# Patient Record
Sex: MALE | Race: WHITE | Employment: OTHER | URBAN - METROPOLITAN AREA
[De-identification: names, ages, dates, MRNs, and addresses within clinical notes are randomized per-mention and may not be internally consistent; named-entity substitution may affect disease eponyms.]

---

## 2018-05-10 ENCOUNTER — HOSPITAL ENCOUNTER (OUTPATIENT)
Age: 68
Setting detail: SPECIMEN
Discharge: HOME | End: 2018-05-10
Payer: MEDICARE

## 2018-05-10 DIAGNOSIS — K57.32 DIVERTICULITIS OF LARGE INTESTINE WITHOUT PERFORATION OR ABSCESS WITHOUT BLEEDING: ICD-10-CM

## 2018-05-10 LAB
ALBUMIN SERPL-MCNC: 4.4 G/DL (ref 3.5–5.2)
ALBUMIN/GLOBULIN RATIO: 1.4 (ref 1–2.5)
ALP BLD-CCNC: 63 U/L (ref 40–129)
ALT SERPL-CCNC: 33 U/L (ref 5–41)
ANION GAP SERPL CALCULATED.3IONS-SCNC: 11 MMOL/L (ref 9–17)
AST SERPL-CCNC: 32 U/L
BILIRUB SERPL-MCNC: 0.36 MG/DL (ref 0.3–1.2)
BUN BLDV-MCNC: 5 MG/DL (ref 8–23)
BUN/CREAT BLD: ABNORMAL (ref 9–20)
CALCIUM SERPL-MCNC: 9.1 MG/DL (ref 8.6–10.4)
CHLORIDE BLD-SCNC: 102 MMOL/L (ref 98–107)
CO2: 27 MMOL/L (ref 20–31)
CREAT SERPL-MCNC: 0.56 MG/DL (ref 0.7–1.2)
GFR AFRICAN AMERICAN: >60 ML/MIN
GFR NON-AFRICAN AMERICAN: >60 ML/MIN
GFR SERPL CREATININE-BSD FRML MDRD: ABNORMAL ML/MIN/{1.73_M2}
GFR SERPL CREATININE-BSD FRML MDRD: ABNORMAL ML/MIN/{1.73_M2}
GLUCOSE BLD-MCNC: 99 MG/DL (ref 70–99)
POTASSIUM SERPL-SCNC: 4.7 MMOL/L (ref 3.7–5.3)
SODIUM BLD-SCNC: 140 MMOL/L (ref 135–144)
TOTAL PROTEIN: 7.5 G/DL (ref 6.4–8.3)

## 2018-05-14 ENCOUNTER — HOSPITAL ENCOUNTER (OUTPATIENT)
Dept: CT IMAGING | Facility: CLINIC | Age: 68
Discharge: HOME | End: 2018-05-16
Payer: MEDICARE

## 2018-05-14 DIAGNOSIS — K57.32 DIVERTICULITIS OF LARGE INTESTINE WITHOUT PERFORATION OR ABSCESS WITHOUT BLEEDING: ICD-10-CM

## 2018-05-14 PROCEDURE — 2580000003 HC RX 258: Performed by: FAMILY MEDICINE

## 2018-05-14 PROCEDURE — 6360000004 HC RX CONTRAST MEDICATION: Performed by: FAMILY MEDICINE

## 2018-05-14 PROCEDURE — 74177 CT ABD & PELVIS W/CONTRAST: CPT

## 2018-05-14 RX ORDER — 0.9 % SODIUM CHLORIDE 0.9 %
80 INTRAVENOUS SOLUTION INTRAVENOUS ONCE
Status: COMPLETED | OUTPATIENT
Start: 2018-05-14 | End: 2018-05-14

## 2018-05-14 RX ORDER — SODIUM CHLORIDE 0.9 % (FLUSH) 0.9 %
10 SYRINGE (ML) INJECTION PRN
Status: DISCONTINUED | OUTPATIENT
Start: 2018-05-14 | End: 2018-05-17 | Stop reason: HOSPADM

## 2018-05-14 RX ADMIN — IOPAMIDOL 75 ML: 755 INJECTION, SOLUTION INTRAVENOUS at 12:04

## 2018-05-14 RX ADMIN — SODIUM CHLORIDE 80 ML: 9 INJECTION, SOLUTION INTRAVENOUS at 12:05

## 2018-05-14 RX ADMIN — IOHEXOL 50 ML: 240 INJECTION, SOLUTION INTRATHECAL; INTRAVASCULAR; INTRAVENOUS; ORAL at 12:04

## 2018-05-14 RX ADMIN — Medication 10 ML: at 12:05

## 2018-09-12 ENCOUNTER — HOSPITAL ENCOUNTER (OUTPATIENT)
Age: 68
Setting detail: SPECIMEN
Discharge: HOME | End: 2018-09-12
Payer: MEDICARE

## 2018-09-12 DIAGNOSIS — E78.00 PURE HYPERCHOLESTEROLEMIA: ICD-10-CM

## 2018-09-12 DIAGNOSIS — R53.83 FATIGUE, UNSPECIFIED TYPE: ICD-10-CM

## 2018-09-12 DIAGNOSIS — E55.9 VITAMIN D DEFICIENCY: ICD-10-CM

## 2018-09-12 DIAGNOSIS — N42.9 ABNORMALITY DETECTED ON RECTAL EXAMINATION OF PROSTATE: ICD-10-CM

## 2018-09-12 DIAGNOSIS — Z00.00 WELL ADULT EXAM: ICD-10-CM

## 2018-09-12 LAB
ABSOLUTE EOS #: 0.2 K/UL (ref 0–0.44)
ABSOLUTE IMMATURE GRANULOCYTE: <0.03 K/UL (ref 0–0.3)
ABSOLUTE LYMPH #: 2.49 K/UL (ref 1.1–3.7)
ABSOLUTE MONO #: 0.63 K/UL (ref 0.1–1.2)
BASOPHILS # BLD: 1 % (ref 0–2)
BASOPHILS ABSOLUTE: 0.05 K/UL (ref 0–0.2)
CHOLESTEROL/HDL RATIO: 2.7
CHOLESTEROL: 183 MG/DL
DIFFERENTIAL TYPE: NORMAL
EOSINOPHILS RELATIVE PERCENT: 3 % (ref 1–4)
HCT VFR BLD CALC: 44.9 % (ref 40.7–50.3)
HDLC SERPL-MCNC: 67 MG/DL
HEMOGLOBIN: 14.1 G/DL (ref 13–17)
IMMATURE GRANULOCYTES: 0 %
LDL CHOLESTEROL: 98 MG/DL (ref 0–130)
LYMPHOCYTES # BLD: 41 % (ref 24–43)
MCH RBC QN AUTO: 27.9 PG (ref 25.2–33.5)
MCHC RBC AUTO-ENTMCNC: 31.4 G/DL (ref 28.4–34.8)
MCV RBC AUTO: 88.9 FL (ref 82.6–102.9)
MONOCYTES # BLD: 11 % (ref 3–12)
NRBC AUTOMATED: 0 PER 100 WBC
PDW BLD-RTO: 13.5 % (ref 11.8–14.4)
PLATELET # BLD: 302 K/UL (ref 138–453)
PLATELET ESTIMATE: NORMAL
PMV BLD AUTO: 11.5 FL (ref 8.1–13.5)
PROSTATE SPECIFIC ANTIGEN: 1.63 UG/L
RBC # BLD: 5.05 M/UL (ref 4.21–5.77)
RBC # BLD: NORMAL 10*6/UL
SEG NEUTROPHILS: 44 % (ref 36–65)
SEGMENTED NEUTROPHILS ABSOLUTE COUNT: 2.64 K/UL (ref 1.5–8.1)
T3 TOTAL: 114 NG/DL (ref 80–200)
T4 TOTAL: 6.7 UG/DL (ref 4.5–12)
TRIGL SERPL-MCNC: 88 MG/DL
TSH SERPL DL<=0.05 MIU/L-ACNC: 1.69 MIU/L (ref 0.3–5)
VITAMIN B-12: 360 PG/ML (ref 232–1245)
VLDLC SERPL CALC-MCNC: NORMAL MG/DL (ref 1–30)
WBC # BLD: 6 K/UL (ref 3.5–11.3)
WBC # BLD: NORMAL 10*3/UL

## 2018-09-13 LAB — VITAMIN D 25-HYDROXY: 44.7 NG/ML (ref 30–100)

## 2019-02-19 PROBLEM — K57.92 DIVERTICULITIS: Status: ACTIVE | Noted: 2019-02-19

## 2019-05-20 ENCOUNTER — HOSPITAL ENCOUNTER (OUTPATIENT)
Age: 69
Setting detail: SPECIMEN
Discharge: HOME | End: 2019-05-20
Payer: MEDICARE

## 2019-05-20 DIAGNOSIS — I10 ESSENTIAL HYPERTENSION: ICD-10-CM

## 2019-05-20 LAB
ANION GAP SERPL CALCULATED.3IONS-SCNC: 16 MMOL/L (ref 9–17)
BUN BLDV-MCNC: 12 MG/DL (ref 8–23)
BUN/CREAT BLD: ABNORMAL (ref 9–20)
CALCIUM SERPL-MCNC: 9 MG/DL (ref 8.6–10.4)
CHLORIDE BLD-SCNC: 102 MMOL/L (ref 98–107)
CO2: 25 MMOL/L (ref 20–31)
CREAT SERPL-MCNC: 0.55 MG/DL (ref 0.7–1.2)
GFR AFRICAN AMERICAN: >60 ML/MIN
GFR NON-AFRICAN AMERICAN: >60 ML/MIN
GFR SERPL CREATININE-BSD FRML MDRD: ABNORMAL ML/MIN/{1.73_M2}
GFR SERPL CREATININE-BSD FRML MDRD: ABNORMAL ML/MIN/{1.73_M2}
GLUCOSE BLD-MCNC: 111 MG/DL (ref 70–99)
POTASSIUM SERPL-SCNC: 4.6 MMOL/L (ref 3.7–5.3)
SODIUM BLD-SCNC: 143 MMOL/L (ref 135–144)

## 2019-09-05 PROBLEM — F39 MOOD DISORDER (HCC): Status: ACTIVE | Noted: 2019-09-05

## 2020-03-10 ENCOUNTER — APPOINTMENT (OUTPATIENT)
Dept: GENERAL RADIOLOGY | Facility: CLINIC | Age: 70
End: 2020-03-10
Payer: MEDICARE

## 2020-03-10 ENCOUNTER — HOSPITAL ENCOUNTER (EMERGENCY)
Facility: CLINIC | Age: 70
Discharge: HOME | End: 2020-03-10
Attending: SPECIALIST
Payer: MEDICARE

## 2020-03-10 VITALS
HEIGHT: 66 IN | TEMPERATURE: 98.6 F | SYSTOLIC BLOOD PRESSURE: 153 MMHG | OXYGEN SATURATION: 97 % | WEIGHT: 152 LBS | HEART RATE: 64 BPM | DIASTOLIC BLOOD PRESSURE: 78 MMHG | RESPIRATION RATE: 20 BRPM | BODY MASS INDEX: 24.43 KG/M2

## 2020-03-10 LAB
ABSOLUTE EOS #: 0.1 K/UL (ref 0–0.4)
ABSOLUTE IMMATURE GRANULOCYTE: NORMAL K/UL (ref 0–0.3)
ABSOLUTE LYMPH #: 2.4 K/UL (ref 1–4.8)
ABSOLUTE MONO #: 0.7 K/UL (ref 0.1–1.2)
ANION GAP SERPL CALCULATED.3IONS-SCNC: 15 MMOL/L (ref 9–17)
BASOPHILS # BLD: 1 % (ref 0–2)
BASOPHILS ABSOLUTE: 0.1 K/UL (ref 0–0.2)
BNP INTERPRETATION: NORMAL
BUN BLDV-MCNC: 10 MG/DL (ref 8–23)
BUN/CREAT BLD: ABNORMAL (ref 9–20)
CALCIUM SERPL-MCNC: 9.3 MG/DL (ref 8.6–10.4)
CHLORIDE BLD-SCNC: 100 MMOL/L (ref 98–107)
CO2: 25 MMOL/L (ref 20–31)
CREAT SERPL-MCNC: 0.5 MG/DL (ref 0.7–1.2)
D-DIMER QUANTITATIVE: 0.47 MG/L FEU
DIFFERENTIAL TYPE: NORMAL
EOSINOPHILS RELATIVE PERCENT: 2 % (ref 1–4)
GFR AFRICAN AMERICAN: >60 ML/MIN
GFR NON-AFRICAN AMERICAN: >60 ML/MIN
GFR SERPL CREATININE-BSD FRML MDRD: ABNORMAL ML/MIN/{1.73_M2}
GFR SERPL CREATININE-BSD FRML MDRD: ABNORMAL ML/MIN/{1.73_M2}
GLUCOSE BLD-MCNC: 111 MG/DL (ref 70–99)
HCT VFR BLD CALC: 45 % (ref 41–53)
HEMOGLOBIN: 14.6 G/DL (ref 13.5–17.5)
IMMATURE GRANULOCYTES: NORMAL %
LYMPHOCYTES # BLD: 32 % (ref 24–44)
MCH RBC QN AUTO: 27.8 PG (ref 26–34)
MCHC RBC AUTO-ENTMCNC: 32.4 G/DL (ref 31–37)
MCV RBC AUTO: 86 FL (ref 80–100)
MONOCYTES # BLD: 9 % (ref 2–11)
NRBC AUTOMATED: NORMAL PER 100 WBC
PDW BLD-RTO: 14.2 % (ref 12.5–15.4)
PLATELET # BLD: 250 K/UL (ref 140–450)
PLATELET ESTIMATE: NORMAL
PMV BLD AUTO: 8.5 FL (ref 6–12)
POTASSIUM SERPL-SCNC: 4.2 MMOL/L (ref 3.7–5.3)
PRO-BNP: 39 PG/ML
RBC # BLD: 5.24 M/UL (ref 4.5–5.9)
RBC # BLD: NORMAL 10*6/UL
SEG NEUTROPHILS: 56 % (ref 36–66)
SEGMENTED NEUTROPHILS ABSOLUTE COUNT: 4.3 K/UL (ref 1.8–7.7)
SODIUM BLD-SCNC: 140 MMOL/L (ref 135–144)
TROPONIN INTERP: NORMAL
TROPONIN INTERP: NORMAL
TROPONIN T: NORMAL NG/ML
TROPONIN T: NORMAL NG/ML
TROPONIN, HIGH SENSITIVITY: <6 NG/L (ref 0–22)
TROPONIN, HIGH SENSITIVITY: <6 NG/L (ref 0–22)
WBC # BLD: 7.6 K/UL (ref 3.5–11)
WBC # BLD: NORMAL 10*3/UL

## 2020-03-10 PROCEDURE — 85025 COMPLETE CBC W/AUTO DIFF WBC: CPT

## 2020-03-10 PROCEDURE — 83880 ASSAY OF NATRIURETIC PEPTIDE: CPT

## 2020-03-10 PROCEDURE — 85379 FIBRIN DEGRADATION QUANT: CPT

## 2020-03-10 PROCEDURE — 80048 BASIC METABOLIC PNL TOTAL CA: CPT

## 2020-03-10 PROCEDURE — 93005 ELECTROCARDIOGRAM TRACING: CPT | Performed by: SPECIALIST

## 2020-03-10 PROCEDURE — 71046 X-RAY EXAM CHEST 2 VIEWS: CPT

## 2020-03-10 PROCEDURE — 6370000000 HC RX 637 (ALT 250 FOR IP): Performed by: SPECIALIST

## 2020-03-10 PROCEDURE — 36415 COLL VENOUS BLD VENIPUNCTURE: CPT

## 2020-03-10 PROCEDURE — 99285 EMERGENCY DEPT VISIT HI MDM: CPT

## 2020-03-10 PROCEDURE — 84484 ASSAY OF TROPONIN QUANT: CPT

## 2020-03-10 RX ORDER — ASPIRIN 81 MG/1
324 TABLET, CHEWABLE ORAL ONCE
Status: COMPLETED | OUTPATIENT
Start: 2020-03-10 | End: 2020-03-10

## 2020-03-10 RX ADMIN — ASPIRIN 81 MG 324 MG: 81 TABLET ORAL at 15:50

## 2020-03-10 ASSESSMENT — PAIN DESCRIPTION - LOCATION
LOCATION: CHEST
LOCATION: CHEST

## 2020-03-10 ASSESSMENT — PAIN SCALES - GENERAL
PAINLEVEL_OUTOF10: 3

## 2020-03-10 ASSESSMENT — PAIN DESCRIPTION - ORIENTATION: ORIENTATION: RIGHT

## 2020-03-10 ASSESSMENT — PAIN DESCRIPTION - DESCRIPTORS: DESCRIPTORS: SHARP

## 2020-03-10 NOTE — ED PROVIDER NOTES
[facility]  [facility]  Phone: [phone]      Pt Name: Juan Jimenez  MRN: [id number]  [place] 1950  Date of evaluation: 3/10/2020      CHIEF COMPLAINT       Chief Complaint   Patient presents with    Chest Pain     pt states chest pain for past 5-6 days, right sided  \"hurts to move and take a deep breath\"  sent from [place] office. no sob. no medication for symptoms         HISTORY OF PRESENT ILLNESS    Juan Jimenez is a 71 y.o. male who presents   Chief Complaint   Patient presents with    Chest Pain     pt states chest pain for past 5-6 days, right sided  \"hurts to move and take a deep breath\"  sent from [place] office. no sob. no medication for symptoms   . 80-year-old male patient presents to the emergency department transferred from his primary care physician's office with history of right sided chest pain in the inframammary region for last 5 to 6 days, worse since the previous night and on the day of evaluation. Pain increases with the movements and deep breaths. Patient denies any associated symptoms specifically denies any shortness of breath, nausea, vomiting, lightheadedness, dizziness, palpitations or diaphoresis. He also denies any swelling in the legs or calf pain. No history of cough, fever or chills. No history of fall or injuries. Patient is non-smoker. He has history of hypertension and hypercholesterolemia and he has had negative stress test in the past.  He denies any recent long travels or prolonged immobilization and no history of DVT or PE in the past.  He describes pain as sharp in character 3 out of 10 in intensity. Patient has not taken any medications for the above discomfort. REVIEW OF SYSTEMS       Review of Systems   Constitutional: Negative for chills, diaphoresis and fever. HENT: Negative for ear pain and sore throat. Respiratory: Negative for cough, shortness of breath and wheezing.     Cardiovascular: Positive for chest pain. Negative for palpitations. Gastrointestinal: Negative for abdominal pain and nausea. Neurological: Negative for dizziness and light-headedness. All other systems reviewed and are negative. PAST MEDICAL HISTORY    has a past medical history of Acid indigestion, Chronic back pain, Hyperlipidemia, Hypertension, and Osteoarthritis. SURGICAL HISTORY      has a past surgical history that includes back surgery (2014). CURRENT MEDICATIONS       Discharge Medication List as of 3/10/2020  6:19 PM      CONTINUE these medications which have NOT CHANGED    Details   simvastatin (ZOCOR) 40 MG tablet take 1 tablet by mouth at bedtime, Disp-90 tablet, R-0Normal      zolpidem (AMBIEN) 10 MG tablet Take 1 tablet by mouth nightly as needed for Sleep for up to 90 days. , Disp-90 tablet, R-0Print      citalopram (CELEXA) 20 MG tablet take 1 tablet by mouth once daily, Disp-90 tablet, R-0Normal      lisinopril (PRINIVIL;ZESTRIL) 20 MG tablet take 1 tablet by mouth once daily, Disp-90 tablet, R-1Normal      amLODIPine (NORVASC) 5 MG tablet take 1 tablet by mouth once daily, Disp-90 tablet, R-1Normal      meloxicam (MOBIC) 15 MG tablet take 1 tablet by mouth once daily, Disp-90 tablet, R-1Normal      dicyclomine (BENTYL) 20 MG tablet Take 1 tablet by mouth daily, R-0Historical Med      RA COL-RITE 100 MG capsule Take 1 capsule by mouth 2 times daily, R-0, DAWHistorical Med      Lactobacillus TABS Take 4 mg by mouth 2 times dailyHistorical Med      esomeprazole Magnesium (NEXIUM) 40 MG PACK Take 1 packet by mouth daily, Disp-90 packet, R-0             ALLERGIES     has No Known Allergies. FAMILY HISTORY     He indicated that his sister is alive. He indicated that his brother is alive. family history includes COPD in his brother and sister. SOCIAL HISTORY      reports that he quit smoking about 41 years ago. He has a 25.00 pack-year smoking history.  He has never used smokeless tobacco. He reports current alcohol use of about 2.0 standard drinks of alcohol per week. He reports that he does not use drugs. PHYSICAL EXAM     INITIAL VITALS:  height is 5' 6\" (1.676 m) and weight is 68.9 kg (152 lb). His oral temperature is 98.6 °F (37 °C). His blood pressure is 153/78 (abnormal) and his pulse is 64. His respiration is 20 and oxygen saturation is 97%. Physical Exam  Vitals signs and nursing note reviewed. Constitutional:       Appearance: He is well-developed. HENT:      Head: Normocephalic and atraumatic. Nose: Nose normal.   Eyes:      Extraocular Movements: Extraocular movements intact. Pupils: Pupils are equal, round, and reactive to light. Neck:      Musculoskeletal: Normal range of motion and neck supple. Cardiovascular:      Rate and Rhythm: Normal rate and regular rhythm. Heart sounds: Normal heart sounds. No murmur. Pulmonary:      Effort: Pulmonary effort is normal. No respiratory distress. Breath sounds: Normal breath sounds. Chest:      Chest wall: Tenderness present. No deformity or crepitus. Abdominal:      General: Bowel sounds are normal. There is no distension. Palpations: Abdomen is soft. Tenderness: There is no abdominal tenderness. Skin:     General: Skin is warm and dry. Neurological:      General: No focal deficit present. Mental Status: He is alert and oriented to person, place, and time.            DIFFERENTIAL DIAGNOSIS/ MDM:     Bronchitis, Pneumonia, ACS, PE, Musculoskeletal pain, pneumothorax, thoracic aortic dissection, nonspecific chest pain, gastrointestinal in origin    HEART SCORE    Variable       Score   History  []Highly Suspicious      2     []Moderately Suspicious     1     [x]Slightly Suspicious      0     ECG   []Significant ST-depression     2     [x]Nonspecific Repolarization     1     []Normal       0     Age   [x]>72years old      3    []45-75 years old      3    []<39years old      0     Risk Factors  []>3 risk factors or history of atherosclerotic disease 2     [x]1 or 2 risk factors      1     []No risk factors      0     Troponin  []>3x normal limit      2     []1-3x normal limit      1     [x]< normal limit      0   Risk Factors: DM, current or recent (<one month) smoker, HTN, hyperlipidemia, family history of CAD or obesity     Total Score = 4    Score 0-3: 2.5% Major Acute Coronary Event over the next 6 weeks -> D/C home  Score 4-6: 20.3% MACE -> Admit for Observation  Score 7-10: 72.7% MACE ->Early Invasive Strategies  Chest Pain in the Emergency Room: A Multicenter Validation of the [address]. Rj BE, Six AJ, Al PHYLLIS, Mast TP, [employer], Mast EG, Cindy SH, [facility]. Crit Pathw Cardiol. 2010 Sep; 9(3): 164-169.     Risk Stratification for Acute Coronary Syndrome   Family history of coronary artery disease - No  History of diabetes - No  History of hypertension - Yes  History of hyperlipidemia  - Yes  History of smoking - No  Cocaine use - No  Known coronary artery disease - No  (Patient is classified as low risk if he/she has one or less risk factors excluding diabetes and known coronary artery disease)    Risk Stratification for Thoracic Aortic Dissection (TAD)  Family history of TAD - No  History of connective tissue disorder (i.e. Marfans Syndrome, Abner-Danlos Syndrome) - No  Dons Syndrome - No  History of hypertension - Yes  History of aortic valve disease - No  Pregnancy - No    Risk Stratification for Pulmonary Embolism (PE)  Previous PE - No  Malignancy - No  Obesity - No  Trauma - No  Yoni filter - No  Pregnancy/postpartum - No  Smoking - No  Prior DVT - No  Immobilization (i.e. leg cast, travel) - No  Surgery within the last 60 days - No  Coagulation disorder - No  Estrogen medicine - No    DIAGNOSTIC RESULTS     EKG: All EKG's are interpreted by the Emergency Department Physician who either signs or Co-signs this chart in the absence of a cardiologist.    EKG Interpretation #1    Interpreted by emergency department physician    Rhythm: normal sinus   Rate: normal  Axis: normal  Conduction: normal  ST Segments: no acute change  T Waves: no acute change  Q Waves: no acute change    Clinical Impression: no acute change, but this is a nonspecific EKG. EKG Interpretation # 2    Interpreted by emergency department physician    Rhythm: normal sinus   Rate: normal  Axis: normal  Conduction: normal  ST Segments: no acute change  T Waves: no acute change  Q Waves: no acute change    Clinical Impression: no acute change, but this is a nonspecific EKG. RADIOLOGY:   I directly visualized the following  images and reviewed the radiologist interpretations:  XR CHEST STANDARD (2 VW)   Final Result   No acute intrathoracic process. Xr Chest Standard (2 Vw)    Result Date: 3/10/2020  EXAMINATION: TWO XRAY VIEWS OF THE CHEST 3/10/2020 3:51 pm COMPARISON: Chest x-ray dated 10/31/2006 HISTORY: ORDERING SYSTEM PROVIDED HISTORY: chest pain TECHNOLOGIST PROVIDED HISTORY: chest pain Reason for Exam: Pt c/o chest pain x 5 days Acuity: Acute Type of Exam: Initial FINDINGS: Cardiomediastinal silhouette and pulmonary vasculature are within normal limits. No focal airspace consolidation, pneumothorax, or pleural effusion. No free air beneath the diaphragm. No acute osseous abnormality. No acute intrathoracic process. LABS:  Labs Reviewed   BASIC METABOLIC PANEL W/ REFLEX TO MG FOR LOW K - Abnormal; Notable for the following components:       Result Value    Glucose 111 (*)     CREATININE 0.50 (*)     All other components within normal limits   CBC WITH AUTO DIFFERENTIAL   TROPONIN   BRAIN NATRIURETIC PEPTIDE   D-DIMER, QUANTITATIVE   TROPONIN       I reviewed all the lab results and these are unremarkable.   2 sets of cardiac markers are normal and d-dimer is negative    EMERGENCY DEPARTMENT COURSE:   Vitals:    Vitals:    03/10/20 1636 03/10/20 1710 03/10/20 1744 03/10/20 1808   BP: (!) 157/73 (!) 141/71 (!) 147/89 (!) 153/78   Pulse: 57 63 62 64   Resp: 20 20 20 20   Temp:       TempSrc:       SpO2: 97% 97% 97% 97%   Weight:       Height:         -------------------------  BP: (!) 153/78, Temp: 98.6 °F (37 °C), Pulse: 64, Resp: 20    Orders Placed This Encounter   Medications    aspirin chewable tablet 324 mg       During the emergency department course, patient was put on the monitor and saline Hep-Lock was started. Patient was given aspirin 324 mg orally. Patient remained pain-free throughout the emergency department course except with the movements he developed right inframammary sharp pain. He has tenderness upon palpation. His pain is very atypical and could very well be musculoskeletal in origin. 2 sets of cardiac markers are normal.  Patient prefers to go home. His heart score is 4 and he was advised observation admission but he will follow-up with his PCP for further evaluation and stress test as an outpatient. Patient is advised to return if chest pain recurs or if he develops any new symptoms. The patient presents with chest pain that is not suggesting in nature of pulmonary emnbolus, aortic dissection, cardiac ischemia, aortic dissection, or other serious etiology. Given the extremely low risk of these diagnoses further testing and evaluation for these possibilites are not indicated at htis time. The patient has been instructed to return if the symptpoms change or worsen in any way. I have reviewed the disposition diagnosis with the patient and or their family/guardian. I have answered their questions and given discharge instructions. They voiced understanding of these instructions and did not have any further questions or complaints. Re-evaluation Notes    Patient is resting comfortably and does not appear to be in any pain or distress      PROCEDURES:  None    FINAL IMPRESSION      1.  Chest pain, unspecified type DISPOSITION/PLAN   DISPOSITION Decision To Discharge 03/10/2020 06:18:40 PM      Condition on Disposition    Stable    PATIENT REFERRED TO:  Gabriela Kearns 647     Call in 1 day  For reevaluation of current symptoms    [facility] ED  [address]  [phone]    If symptoms worsen, If chest pain recurs or any new symptoms appear      DISCHARGE MEDICATIONS:  Discharge Medication List as of 3/10/2020  6:19 PM          (Please note that portions of this note were completed with a voice recognition program.  Efforts were made to edit the dictations but occasionally words are mis-transcribed.)    Moreau MD, F.A.C.E.P.   Attending Emergency Physician     Alma Delia Nieves MD  03/11/20 [id number]

## 2020-03-11 LAB
EKG ATRIAL RATE: 59 BPM
EKG ATRIAL RATE: 63 BPM
EKG P AXIS: 52 DEGREES
EKG P AXIS: 59 DEGREES
EKG P-R INTERVAL: 164 MS
EKG P-R INTERVAL: 170 MS
EKG Q-T INTERVAL: 414 MS
EKG Q-T INTERVAL: 416 MS
EKG QRS DURATION: 82 MS
EKG QRS DURATION: 86 MS
EKG QTC CALCULATION (BAZETT): 409 MS
EKG QTC CALCULATION (BAZETT): 425 MS
EKG R AXIS: -3 DEGREES
EKG R AXIS: 2 DEGREES
EKG T AXIS: 41 DEGREES
EKG T AXIS: 53 DEGREES
EKG VENTRICULAR RATE: 59 BPM
EKG VENTRICULAR RATE: 63 BPM

## 2020-03-11 ASSESSMENT — ENCOUNTER SYMPTOMS
ABDOMINAL PAIN: 0
SHORTNESS OF BREATH: 0
NAUSEA: 0
COUGH: 0
SORE THROAT: 0
WHEEZING: 0

## 2020-10-16 ENCOUNTER — HOSPITAL ENCOUNTER (OUTPATIENT)
Age: 70
Setting detail: SPECIMEN
Discharge: HOME | End: 2020-10-16
Payer: MEDICARE

## 2020-10-16 LAB
ALBUMIN SERPL-MCNC: 4.4 G/DL (ref 3.5–5.2)
ALBUMIN/GLOBULIN RATIO: 1.6 (ref 1–2.5)
ALP BLD-CCNC: 67 U/L (ref 40–129)
ALT SERPL-CCNC: 33 U/L (ref 5–41)
ANION GAP SERPL CALCULATED.3IONS-SCNC: 12 MMOL/L (ref 9–17)
AST SERPL-CCNC: 29 U/L
BILIRUB SERPL-MCNC: 0.56 MG/DL (ref 0.3–1.2)
BUN BLDV-MCNC: 13 MG/DL (ref 8–23)
BUN/CREAT BLD: ABNORMAL (ref 9–20)
CALCIUM SERPL-MCNC: 9.1 MG/DL (ref 8.6–10.4)
CHLORIDE BLD-SCNC: 101 MMOL/L (ref 98–107)
CHOLESTEROL/HDL RATIO: 3.1
CHOLESTEROL: 234 MG/DL
CO2: 26 MMOL/L (ref 20–31)
CREAT SERPL-MCNC: 0.64 MG/DL (ref 0.7–1.2)
GFR AFRICAN AMERICAN: >60 ML/MIN
GFR NON-AFRICAN AMERICAN: >60 ML/MIN
GFR SERPL CREATININE-BSD FRML MDRD: ABNORMAL ML/MIN/{1.73_M2}
GFR SERPL CREATININE-BSD FRML MDRD: ABNORMAL ML/MIN/{1.73_M2}
GLUCOSE FASTING: 119 MG/DL (ref 70–99)
HDLC SERPL-MCNC: 75 MG/DL
LDL CHOLESTEROL: 125 MG/DL (ref 0–130)
POTASSIUM SERPL-SCNC: 4.2 MMOL/L (ref 3.7–5.3)
PROSTATE SPECIFIC ANTIGEN: 1.69 UG/L
SODIUM BLD-SCNC: 139 MMOL/L (ref 135–144)
TOTAL PROTEIN: 7.2 G/DL (ref 6.4–8.3)
TRIGL SERPL-MCNC: 169 MG/DL
VLDLC SERPL CALC-MCNC: ABNORMAL MG/DL (ref 1–30)

## 2020-10-30 PROBLEM — F51.3 SLEEP WALKING: Status: ACTIVE | Noted: 2020-10-30

## 2021-01-18 PROBLEM — M72.0 DUPUYTREN'S DISEASE OF PALM OF BOTH HANDS: Status: ACTIVE | Noted: 2021-01-12

## 2021-01-18 PROBLEM — M18.0 ARTHRITIS OF CARPOMETACARPAL (CMC) JOINT OF BOTH THUMBS: Status: ACTIVE | Noted: 2021-01-12

## 2021-01-18 PROBLEM — G56.01 CARPAL TUNNEL SYNDROME OF RIGHT WRIST: Status: ACTIVE | Noted: 2021-01-12

## 2022-09-01 ENCOUNTER — HOSPITAL ENCOUNTER (OUTPATIENT)
Dept: GENERAL RADIOLOGY | Facility: CLINIC | Age: 72
Discharge: HOME | End: 2022-09-03
Payer: MEDICARE

## 2022-09-01 DIAGNOSIS — R07.81 RIB PAIN ON LEFT SIDE: ICD-10-CM

## 2022-09-01 DIAGNOSIS — R06.02 SOB (SHORTNESS OF BREATH): ICD-10-CM

## 2022-09-01 PROCEDURE — 71046 X-RAY EXAM CHEST 2 VIEWS: CPT

## 2022-10-20 ENCOUNTER — HOSPITAL ENCOUNTER (OUTPATIENT)
Age: 72
Setting detail: SPECIMEN
Discharge: HOME | End: 2022-10-20

## 2022-10-20 DIAGNOSIS — I10 ESSENTIAL HYPERTENSION: ICD-10-CM

## 2022-10-20 DIAGNOSIS — E07.9 THYROID DISORDER: ICD-10-CM

## 2022-10-20 DIAGNOSIS — E53.8 VITAMIN B12 DEFICIENCY: ICD-10-CM

## 2022-10-20 DIAGNOSIS — E78.2 MIXED HYPERLIPIDEMIA: ICD-10-CM

## 2022-10-20 DIAGNOSIS — N40.1 BENIGN PROSTATIC HYPERPLASIA WITH URINARY OBSTRUCTION: ICD-10-CM

## 2022-10-20 DIAGNOSIS — E55.9 VITAMIN D DEFICIENCY: ICD-10-CM

## 2022-10-20 DIAGNOSIS — R73.01 IFG (IMPAIRED FASTING GLUCOSE): ICD-10-CM

## 2022-10-20 DIAGNOSIS — N13.8 BENIGN PROSTATIC HYPERPLASIA WITH URINARY OBSTRUCTION: ICD-10-CM

## 2022-10-20 LAB
ABSOLUTE EOS #: 0.12 K/UL (ref 0–0.44)
ABSOLUTE IMMATURE GRANULOCYTE: <0.03 K/UL (ref 0–0.3)
ABSOLUTE LYMPH #: 2.11 K/UL (ref 1.1–3.7)
ABSOLUTE MONO #: 0.7 K/UL (ref 0.1–1.2)
ALBUMIN SERPL-MCNC: 4.5 G/DL (ref 3.5–5.2)
ALBUMIN/GLOBULIN RATIO: 1.6 (ref 1–2.5)
ALP BLD-CCNC: 71 U/L (ref 40–129)
ALT SERPL-CCNC: 29 U/L (ref 5–41)
ANION GAP SERPL CALCULATED.3IONS-SCNC: 14 MMOL/L (ref 9–17)
AST SERPL-CCNC: 28 U/L
BASOPHILS # BLD: 1 % (ref 0–2)
BASOPHILS ABSOLUTE: 0.04 K/UL (ref 0–0.2)
BILIRUB SERPL-MCNC: 0.3 MG/DL (ref 0.3–1.2)
BUN BLDV-MCNC: 10 MG/DL (ref 8–23)
CALCIUM SERPL-MCNC: 9.1 MG/DL (ref 8.6–10.4)
CHLORIDE BLD-SCNC: 101 MMOL/L (ref 98–107)
CHOLESTEROL/HDL RATIO: 2.8
CHOLESTEROL: 201 MG/DL
CO2: 25 MMOL/L (ref 20–31)
CREAT SERPL-MCNC: 0.61 MG/DL (ref 0.7–1.2)
EOSINOPHILS RELATIVE PERCENT: 2 % (ref 1–4)
GFR SERPL CREATININE-BSD FRML MDRD: >60 ML/MIN/1.73M2
GLUCOSE FASTING: 109 MG/DL (ref 70–99)
HCT VFR BLD CALC: 45.2 % (ref 40.7–50.3)
HDLC SERPL-MCNC: 71 MG/DL
HEMOGLOBIN: 14.6 G/DL (ref 13–17)
IMMATURE GRANULOCYTES: 0 %
LDL CHOLESTEROL: 97 MG/DL (ref 0–130)
LYMPHOCYTES # BLD: 33 % (ref 24–43)
MCH RBC QN AUTO: 28.5 PG (ref 25.2–33.5)
MCHC RBC AUTO-ENTMCNC: 32.3 G/DL (ref 28.4–34.8)
MCV RBC AUTO: 88.3 FL (ref 82.6–102.9)
MONOCYTES # BLD: 11 % (ref 3–12)
NRBC AUTOMATED: 0 PER 100 WBC
PDW BLD-RTO: 12.9 % (ref 11.8–14.4)
PLATELET # BLD: 276 K/UL (ref 138–453)
PMV BLD AUTO: 11.4 FL (ref 8.1–13.5)
POTASSIUM SERPL-SCNC: 5.1 MMOL/L (ref 3.7–5.3)
PROSTATE SPECIFIC ANTIGEN: 1.98 NG/ML
RBC # BLD: 5.12 M/UL (ref 4.21–5.77)
SEG NEUTROPHILS: 53 % (ref 36–65)
SEGMENTED NEUTROPHILS ABSOLUTE COUNT: 3.51 K/UL (ref 1.5–8.1)
SODIUM BLD-SCNC: 140 MMOL/L (ref 135–144)
TOTAL PROTEIN: 7.4 G/DL (ref 6.4–8.3)
TRIGL SERPL-MCNC: 164 MG/DL
TSH SERPL DL<=0.05 MIU/L-ACNC: 2.04 UIU/ML (ref 0.3–5)
VITAMIN B-12: 430 PG/ML (ref 232–1245)
VITAMIN D 25-HYDROXY: 76.2 NG/ML
WBC # BLD: 6.5 K/UL (ref 3.5–11.3)

## 2022-10-21 LAB — T4 TOTAL: 3.8 UG/DL (ref 4.5–10.9)

## 2023-07-20 ENCOUNTER — HOSPITAL ENCOUNTER (OUTPATIENT)
Age: 73
Setting detail: SPECIMEN
Discharge: HOME | End: 2023-07-20

## 2023-07-20 DIAGNOSIS — M19.90 ACUTE ARTHRITIS: ICD-10-CM

## 2023-07-20 LAB
CRP SERPL HS-MCNC: 8.4 MG/L (ref 0–5)
ERYTHROCYTE [SEDIMENTATION RATE] IN BLOOD BY PHOTOMETRIC METHOD: 8 MM/HR (ref 0–20)
RHEUMATOID FACT SER NEPH-ACNC: <10 IU/ML
URATE SERPL-MCNC: 5.2 MG/DL (ref 3.4–7)

## 2023-07-22 LAB
ANA SER QL IA: NEGATIVE
DSDNA IGG SER QL IA: <0.5 IU/ML
NUCLEAR IGG SER IA-RTO: 0.1 U/ML

## 2024-01-15 DIAGNOSIS — Z01.818 PRE-OP EXAM: Primary | ICD-10-CM

## 2024-01-29 ENCOUNTER — HOSPITAL ENCOUNTER (OUTPATIENT)
Age: 74
Discharge: HOME | End: 2024-01-29
Payer: MEDICARE

## 2024-01-29 DIAGNOSIS — E78.2 MIXED HYPERLIPIDEMIA: ICD-10-CM

## 2024-01-29 LAB
ANION GAP SERPL CALCULATED.3IONS-SCNC: 13 MMOL/L (ref 9–16)
BASOPHILS # BLD: 0.05 K/UL (ref 0–0.2)
BASOPHILS NFR BLD: 1 % (ref 0–2)
BUN SERPL-MCNC: 13 MG/DL (ref 8–23)
CALCIUM SERPL-MCNC: 9.3 MG/DL (ref 8.6–10.4)
CHLORIDE SERPL-SCNC: 101 MMOL/L (ref 98–107)
CHOLEST SERPL-MCNC: 226 MG/DL (ref 0–199)
CHOLESTEROL/HDL RATIO: 4
CO2 SERPL-SCNC: 24 MMOL/L (ref 20–31)
CREAT SERPL-MCNC: 0.8 MG/DL (ref 0.7–1.2)
EOSINOPHIL # BLD: 0.16 K/UL (ref 0–0.44)
EOSINOPHILS RELATIVE PERCENT: 2 % (ref 1–4)
ERYTHROCYTE [DISTWIDTH] IN BLOOD BY AUTOMATED COUNT: 13.2 % (ref 11.8–14.4)
GFR SERPL CREATININE-BSD FRML MDRD: >60 ML/MIN/1.73M2
GLUCOSE SERPL-MCNC: 106 MG/DL (ref 74–99)
HCT VFR BLD AUTO: 45.8 % (ref 40.7–50.3)
HDLC SERPL-MCNC: 64 MG/DL
HGB BLD-MCNC: 15 G/DL (ref 13–17)
IMM GRANULOCYTES # BLD AUTO: <0.03 K/UL (ref 0–0.3)
IMM GRANULOCYTES NFR BLD: 0 %
LDLC SERPL CALC-MCNC: 115 MG/DL (ref 0–100)
LYMPHOCYTES NFR BLD: 2.67 K/UL (ref 1.1–3.7)
LYMPHOCYTES RELATIVE PERCENT: 35 % (ref 24–43)
MCH RBC QN AUTO: 28.5 PG (ref 25.2–33.5)
MCHC RBC AUTO-ENTMCNC: 32.8 G/DL (ref 28.4–34.8)
MCV RBC AUTO: 86.9 FL (ref 82.6–102.9)
MONOCYTES NFR BLD: 0.62 K/UL (ref 0.1–1.2)
MONOCYTES NFR BLD: 8 % (ref 3–12)
NEUTROPHILS NFR BLD: 54 % (ref 36–65)
NEUTS SEG NFR BLD: 4.05 K/UL (ref 1.5–8.1)
NRBC BLD-RTO: 0 PER 100 WBC
PLATELET # BLD AUTO: 275 K/UL (ref 138–453)
PMV BLD AUTO: 10.5 FL (ref 8.1–13.5)
POTASSIUM SERPL-SCNC: 4.5 MMOL/L (ref 3.7–5.3)
RBC # BLD AUTO: 5.27 M/UL (ref 4.21–5.77)
SODIUM SERPL-SCNC: 138 MMOL/L (ref 136–145)
TRIGL SERPL-MCNC: 236 MG/DL
VLDLC SERPL CALC-MCNC: 47 MG/DL
WBC OTHER # BLD: 7.6 K/UL (ref 3.5–11.3)

## 2024-01-29 PROCEDURE — 36415 COLL VENOUS BLD VENIPUNCTURE: CPT

## 2024-01-29 PROCEDURE — 80048 BASIC METABOLIC PNL TOTAL CA: CPT

## 2024-01-29 PROCEDURE — 93005 ELECTROCARDIOGRAM TRACING: CPT | Performed by: ORTHOPAEDIC SURGERY

## 2024-01-29 PROCEDURE — 85025 COMPLETE CBC W/AUTO DIFF WBC: CPT

## 2024-01-29 PROCEDURE — 80061 LIPID PANEL: CPT

## 2024-01-30 LAB
EKG ATRIAL RATE: 67 BPM
EKG P AXIS: 57 DEGREES
EKG P-R INTERVAL: 164 MS
EKG Q-T INTERVAL: 402 MS
EKG QRS DURATION: 92 MS
EKG QTC CALCULATION (BAZETT): 424 MS
EKG T AXIS: 54 DEGREES
EKG VENTRICULAR RATE: 67 BPM

## 2024-01-30 PROCEDURE — 93010 ELECTROCARDIOGRAM REPORT: CPT | Performed by: INTERNAL MEDICINE

## 2024-02-01 ENCOUNTER — ANESTHESIA EVENT (OUTPATIENT)
Dept: OPERATING ROOM | Age: 74
End: 2024-02-01
Payer: MEDICARE

## 2024-02-01 NOTE — PROGRESS NOTES
Pt called and notified that preop testing results were faxed to his pcp and to follow-up with pcp regarding EKG nd medical clearance.

## 2024-02-01 NOTE — PROGRESS NOTES
Dr. Dinh reviewed preop testing labs and EKG- requests Medical clearance for possible EKG changes. Faxed results EKG and labs to Dr. Fuller with medical clearance request form. Dr. Salter office notified.

## 2024-02-05 NOTE — PROGRESS NOTES
[facility] cardiology clearance note  received for the surgery here 2-8-24

## 2024-02-08 ENCOUNTER — APPOINTMENT (OUTPATIENT)
Dept: GENERAL RADIOLOGY | Age: 74
End: 2024-02-08
Attending: ORTHOPAEDIC SURGERY
Payer: MEDICARE

## 2024-02-08 ENCOUNTER — ANESTHESIA (OUTPATIENT)
Dept: OPERATING ROOM | Age: 74
End: 2024-02-08
Payer: MEDICARE

## 2024-02-08 ENCOUNTER — HOSPITAL ENCOUNTER (OUTPATIENT)
Age: 74
Setting detail: OUTPATIENT SURGERY
Discharge: HOME | End: 2024-02-08
Attending: ORTHOPAEDIC SURGERY | Admitting: ORTHOPAEDIC SURGERY
Payer: MEDICARE

## 2024-02-08 VITALS
BODY MASS INDEX: 25.26 KG/M2 | DIASTOLIC BLOOD PRESSURE: 89 MMHG | RESPIRATION RATE: 17 BRPM | HEIGHT: 66 IN | SYSTOLIC BLOOD PRESSURE: 133 MMHG | OXYGEN SATURATION: 94 % | WEIGHT: 157.2 LBS | TEMPERATURE: 97.3 F | HEART RATE: 69 BPM

## 2024-02-08 DIAGNOSIS — G89.18 POST-OP PAIN: Primary | ICD-10-CM

## 2024-02-08 PROCEDURE — 2500000003 HC RX 250 WO HCPCS: Performed by: ANESTHESIOLOGY

## 2024-02-08 PROCEDURE — 2580000003 HC RX 258: Performed by: ORTHOPAEDIC SURGERY

## 2024-02-08 PROCEDURE — 25332 REVISE WRIST JOINT: CPT | Performed by: ORTHOPAEDIC SURGERY

## 2024-02-08 PROCEDURE — 2580000003 HC RX 258: Performed by: ANESTHESIOLOGY

## 2024-02-08 PROCEDURE — 7100000011 HC PHASE II RECOVERY - ADDTL 15 MIN: Performed by: ORTHOPAEDIC SURGERY

## 2024-02-08 PROCEDURE — 6360000002 HC RX W HCPCS

## 2024-02-08 PROCEDURE — 6360000002 HC RX W HCPCS: Performed by: NURSE ANESTHETIST, CERTIFIED REGISTERED

## 2024-02-08 PROCEDURE — 6360000002 HC RX W HCPCS: Performed by: ORTHOPAEDIC SURGERY

## 2024-02-08 PROCEDURE — 7100000010 HC PHASE II RECOVERY - FIRST 15 MIN: Performed by: ORTHOPAEDIC SURGERY

## 2024-02-08 PROCEDURE — 7100000000 HC PACU RECOVERY - FIRST 15 MIN: Performed by: ORTHOPAEDIC SURGERY

## 2024-02-08 PROCEDURE — 2709999900 HC NON-CHARGEABLE SUPPLY: Performed by: ORTHOPAEDIC SURGERY

## 2024-02-08 PROCEDURE — 3700000000 HC ANESTHESIA ATTENDED CARE: Performed by: ORTHOPAEDIC SURGERY

## 2024-02-08 PROCEDURE — 3600000003 HC SURGERY LEVEL 3 BASE: Performed by: ORTHOPAEDIC SURGERY

## 2024-02-08 PROCEDURE — 7100000001 HC PACU RECOVERY - ADDTL 15 MIN: Performed by: ORTHOPAEDIC SURGERY

## 2024-02-08 PROCEDURE — 2500000003 HC RX 250 WO HCPCS: Performed by: NURSE ANESTHETIST, CERTIFIED REGISTERED

## 2024-02-08 PROCEDURE — 6360000002 HC RX W HCPCS: Performed by: ANESTHESIOLOGY

## 2024-02-08 PROCEDURE — 64415 NJX AA&/STRD BRCH PLXS IMG: CPT | Performed by: ANESTHESIOLOGY

## 2024-02-08 PROCEDURE — 2780000010 HC IMPLANT OTHER: Performed by: ORTHOPAEDIC SURGERY

## 2024-02-08 PROCEDURE — 3600000013 HC SURGERY LEVEL 3 ADDTL 15MIN: Performed by: ORTHOPAEDIC SURGERY

## 2024-02-08 PROCEDURE — 3700000001 HC ADD 15 MINUTES (ANESTHESIA): Performed by: ORTHOPAEDIC SURGERY

## 2024-02-08 RX ORDER — KETOROLAC TROMETHAMINE 30 MG/ML
INJECTION, SOLUTION INTRAMUSCULAR; INTRAVENOUS PRN
Status: DISCONTINUED | OUTPATIENT
Start: 2024-02-08 | End: 2024-02-08 | Stop reason: SDUPTHER

## 2024-02-08 RX ORDER — SODIUM CHLORIDE 0.9 % (FLUSH) 0.9 %
5-40 SYRINGE (ML) INJECTION EVERY 12 HOURS SCHEDULED
Status: DISCONTINUED | OUTPATIENT
Start: 2024-02-08 | End: 2024-02-08 | Stop reason: HOSPADM

## 2024-02-08 RX ORDER — SODIUM CHLORIDE 9 MG/ML
INJECTION, SOLUTION INTRAVENOUS PRN
Status: DISCONTINUED | OUTPATIENT
Start: 2024-02-08 | End: 2024-02-08 | Stop reason: HOSPADM

## 2024-02-08 RX ORDER — MIDAZOLAM HYDROCHLORIDE 2 MG/2ML
2 INJECTION, SOLUTION INTRAMUSCULAR; INTRAVENOUS ONCE
Status: COMPLETED | OUTPATIENT
Start: 2024-02-08 | End: 2024-02-08

## 2024-02-08 RX ORDER — OXYCODONE HYDROCHLORIDE AND ACETAMINOPHEN 5; 325 MG/1; MG/1
2 TABLET ORAL
Status: DISCONTINUED | OUTPATIENT
Start: 2024-02-08 | End: 2024-02-08 | Stop reason: HOSPADM

## 2024-02-08 RX ORDER — GLYCOPYRROLATE 0.2 MG/ML
INJECTION INTRAMUSCULAR; INTRAVENOUS PRN
Status: DISCONTINUED | OUTPATIENT
Start: 2024-02-08 | End: 2024-02-08 | Stop reason: SDUPTHER

## 2024-02-08 RX ORDER — DIPHENHYDRAMINE HYDROCHLORIDE 50 MG/ML
12.5 INJECTION INTRAMUSCULAR; INTRAVENOUS
Status: DISCONTINUED | OUTPATIENT
Start: 2024-02-08 | End: 2024-02-08 | Stop reason: HOSPADM

## 2024-02-08 RX ORDER — ONDANSETRON 2 MG/ML
4 INJECTION INTRAMUSCULAR; INTRAVENOUS
Status: DISCONTINUED | OUTPATIENT
Start: 2024-02-08 | End: 2024-02-08 | Stop reason: HOSPADM

## 2024-02-08 RX ORDER — SODIUM CHLORIDE 0.9 % (FLUSH) 0.9 %
5-40 SYRINGE (ML) INJECTION PRN
Status: DISCONTINUED | OUTPATIENT
Start: 2024-02-08 | End: 2024-02-08 | Stop reason: HOSPADM

## 2024-02-08 RX ORDER — ROPIVACAINE HYDROCHLORIDE 5 MG/ML
INJECTION, SOLUTION EPIDURAL; INFILTRATION; PERINEURAL
Status: COMPLETED
Start: 2024-02-08 | End: 2024-02-08

## 2024-02-08 RX ORDER — HYDRALAZINE HYDROCHLORIDE 20 MG/ML
10 INJECTION INTRAMUSCULAR; INTRAVENOUS
Status: DISCONTINUED | OUTPATIENT
Start: 2024-02-08 | End: 2024-02-08 | Stop reason: HOSPADM

## 2024-02-08 RX ORDER — OXYCODONE HYDROCHLORIDE AND ACETAMINOPHEN 5; 325 MG/1; MG/1
1 TABLET ORAL
Status: DISCONTINUED | OUTPATIENT
Start: 2024-02-08 | End: 2024-02-08 | Stop reason: HOSPADM

## 2024-02-08 RX ORDER — SODIUM CHLORIDE, SODIUM LACTATE, POTASSIUM CHLORIDE, CALCIUM CHLORIDE 600; 310; 30; 20 MG/100ML; MG/100ML; MG/100ML; MG/100ML
INJECTION, SOLUTION INTRAVENOUS CONTINUOUS
Status: DISCONTINUED | OUTPATIENT
Start: 2024-02-08 | End: 2024-02-08 | Stop reason: HOSPADM

## 2024-02-08 RX ORDER — FENTANYL CITRATE 50 UG/ML
INJECTION, SOLUTION INTRAMUSCULAR; INTRAVENOUS PRN
Status: DISCONTINUED | OUTPATIENT
Start: 2024-02-08 | End: 2024-02-08 | Stop reason: SDUPTHER

## 2024-02-08 RX ORDER — EPHEDRINE SULFATE/0.9% NACL/PF 50 MG/5 ML
SYRINGE (ML) INTRAVENOUS PRN
Status: DISCONTINUED | OUTPATIENT
Start: 2024-02-08 | End: 2024-02-08 | Stop reason: SDUPTHER

## 2024-02-08 RX ORDER — METOCLOPRAMIDE HYDROCHLORIDE 5 MG/ML
10 INJECTION INTRAMUSCULAR; INTRAVENOUS
Status: DISCONTINUED | OUTPATIENT
Start: 2024-02-08 | End: 2024-02-08 | Stop reason: HOSPADM

## 2024-02-08 RX ORDER — MEPERIDINE HYDROCHLORIDE 50 MG/ML
12.5 INJECTION INTRAMUSCULAR; INTRAVENOUS; SUBCUTANEOUS EVERY 5 MIN PRN
Status: DISCONTINUED | OUTPATIENT
Start: 2024-02-08 | End: 2024-02-08 | Stop reason: HOSPADM

## 2024-02-08 RX ORDER — DEXAMETHASONE SODIUM PHOSPHATE 10 MG/ML
INJECTION, SOLUTION INTRAMUSCULAR; INTRAVENOUS
Status: DISCONTINUED
Start: 2024-02-08 | End: 2024-02-08 | Stop reason: WASHOUT

## 2024-02-08 RX ORDER — DEXAMETHASONE SODIUM PHOSPHATE 10 MG/ML
INJECTION, SOLUTION INTRAMUSCULAR; INTRAVENOUS PRN
Status: DISCONTINUED | OUTPATIENT
Start: 2024-02-08 | End: 2024-02-08 | Stop reason: SDUPTHER

## 2024-02-08 RX ORDER — MIDAZOLAM HYDROCHLORIDE 1 MG/ML
INJECTION INTRAMUSCULAR; INTRAVENOUS
Status: COMPLETED
Start: 2024-02-08 | End: 2024-02-08

## 2024-02-08 RX ORDER — OXYCODONE HYDROCHLORIDE AND ACETAMINOPHEN 5; 325 MG/1; MG/1
1-2 TABLET ORAL EVERY 4 HOURS PRN
Qty: 50 TABLET | Refills: 0 | Status: SHIPPED | OUTPATIENT
Start: 2024-02-08 | End: 2024-02-15

## 2024-02-08 RX ORDER — GLYCOPYRROLATE 0.2 MG/ML
0.4 INJECTION INTRAMUSCULAR; INTRAVENOUS ONCE
Status: DISCONTINUED | OUTPATIENT
Start: 2024-02-08 | End: 2024-02-08 | Stop reason: HOSPADM

## 2024-02-08 RX ORDER — PROMETHAZINE HYDROCHLORIDE 25 MG/ML
6.25 INJECTION, SOLUTION INTRAMUSCULAR; INTRAVENOUS EVERY 5 MIN PRN
Status: DISCONTINUED | OUTPATIENT
Start: 2024-02-08 | End: 2024-02-08 | Stop reason: HOSPADM

## 2024-02-08 RX ORDER — MIDAZOLAM HYDROCHLORIDE 2 MG/2ML
2 INJECTION, SOLUTION INTRAMUSCULAR; INTRAVENOUS
Status: DISCONTINUED | OUTPATIENT
Start: 2024-02-08 | End: 2024-02-08 | Stop reason: HOSPADM

## 2024-02-08 RX ORDER — LIDOCAINE HYDROCHLORIDE 20 MG/ML
INJECTION, SOLUTION INFILTRATION; PERINEURAL
Status: COMPLETED
Start: 2024-02-08 | End: 2024-02-08

## 2024-02-08 RX ORDER — FENTANYL CITRATE 50 UG/ML
100 INJECTION, SOLUTION INTRAMUSCULAR; INTRAVENOUS ONCE
Status: COMPLETED | OUTPATIENT
Start: 2024-02-08 | End: 2024-02-08

## 2024-02-08 RX ORDER — PROPOFOL 10 MG/ML
INJECTION, EMULSION INTRAVENOUS PRN
Status: DISCONTINUED | OUTPATIENT
Start: 2024-02-08 | End: 2024-02-08 | Stop reason: SDUPTHER

## 2024-02-08 RX ORDER — LIDOCAINE HYDROCHLORIDE 10 MG/ML
INJECTION, SOLUTION INFILTRATION; PERINEURAL PRN
Status: DISCONTINUED | OUTPATIENT
Start: 2024-02-08 | End: 2024-02-08 | Stop reason: SDUPTHER

## 2024-02-08 RX ORDER — FENTANYL CITRATE 50 UG/ML
INJECTION, SOLUTION INTRAMUSCULAR; INTRAVENOUS
Status: COMPLETED
Start: 2024-02-08 | End: 2024-02-08

## 2024-02-08 RX ORDER — ROPIVACAINE HYDROCHLORIDE 5 MG/ML
INJECTION, SOLUTION EPIDURAL; INFILTRATION; PERINEURAL
Status: COMPLETED | OUTPATIENT
Start: 2024-02-08 | End: 2024-02-08

## 2024-02-08 RX ORDER — LABETALOL HYDROCHLORIDE 5 MG/ML
10 INJECTION, SOLUTION INTRAVENOUS
Status: DISCONTINUED | OUTPATIENT
Start: 2024-02-08 | End: 2024-02-08 | Stop reason: HOSPADM

## 2024-02-08 RX ORDER — LIDOCAINE HYDROCHLORIDE 10 MG/ML
1 INJECTION, SOLUTION EPIDURAL; INFILTRATION; INTRACAUDAL; PERINEURAL
Status: DISCONTINUED | OUTPATIENT
Start: 2024-02-08 | End: 2024-02-08 | Stop reason: HOSPADM

## 2024-02-08 RX ORDER — IPRATROPIUM BROMIDE AND ALBUTEROL SULFATE 2.5; .5 MG/3ML; MG/3ML
1 SOLUTION RESPIRATORY (INHALATION)
Status: DISCONTINUED | OUTPATIENT
Start: 2024-02-08 | End: 2024-02-08 | Stop reason: HOSPADM

## 2024-02-08 RX ORDER — CEPHALEXIN 500 MG/1
500 CAPSULE ORAL 4 TIMES DAILY
Qty: 20 CAPSULE | Refills: 0 | Status: SHIPPED | OUTPATIENT
Start: 2024-02-08 | End: 2024-02-13

## 2024-02-08 RX ORDER — CEFAZOLIN 2 G/1
INJECTION, POWDER, FOR SOLUTION INTRAMUSCULAR; INTRAVENOUS
Status: DISCONTINUED
Start: 2024-02-08 | End: 2024-02-08 | Stop reason: HOSPADM

## 2024-02-08 RX ORDER — LIDOCAINE HYDROCHLORIDE 20 MG/ML
INJECTION, SOLUTION INFILTRATION; PERINEURAL
Status: COMPLETED | OUTPATIENT
Start: 2024-02-08 | End: 2024-02-08

## 2024-02-08 RX ORDER — ONDANSETRON 2 MG/ML
INJECTION INTRAMUSCULAR; INTRAVENOUS PRN
Status: DISCONTINUED | OUTPATIENT
Start: 2024-02-08 | End: 2024-02-08 | Stop reason: SDUPTHER

## 2024-02-08 RX ORDER — DEXAMETHASONE SODIUM PHOSPHATE 4 MG/ML
INJECTION, SOLUTION INTRA-ARTICULAR; INTRALESIONAL; INTRAMUSCULAR; INTRAVENOUS; SOFT TISSUE
Status: DISCONTINUED
Start: 2024-02-08 | End: 2024-02-08 | Stop reason: HOSPADM

## 2024-02-08 RX ORDER — MORPHINE SULFATE 2 MG/ML
2 INJECTION, SOLUTION INTRAMUSCULAR; INTRAVENOUS EVERY 5 MIN PRN
Status: DISCONTINUED | OUTPATIENT
Start: 2024-02-08 | End: 2024-02-08 | Stop reason: HOSPADM

## 2024-02-08 RX ADMIN — FENTANYL CITRATE 100 MCG: 50 INJECTION, SOLUTION INTRAMUSCULAR; INTRAVENOUS at 11:13

## 2024-02-08 RX ADMIN — DEXAMETHASONE SODIUM PHOSPHATE 10 MG: 10 INJECTION, SOLUTION INTRAMUSCULAR; INTRAVENOUS at 12:39

## 2024-02-08 RX ADMIN — KETOROLAC TROMETHAMINE 30 MG: 30 INJECTION, SOLUTION INTRAMUSCULAR; INTRAVENOUS at 12:39

## 2024-02-08 RX ADMIN — ONDANSETRON 4 MG: 2 INJECTION INTRAMUSCULAR; INTRAVENOUS at 12:39

## 2024-02-08 RX ADMIN — SODIUM CHLORIDE, POTASSIUM CHLORIDE, SODIUM LACTATE AND CALCIUM CHLORIDE: 600; 310; 30; 20 INJECTION, SOLUTION INTRAVENOUS at 13:55

## 2024-02-08 RX ADMIN — LIDOCAINE HYDROCHLORIDE 50 MG: 10 INJECTION, SOLUTION INFILTRATION; PERINEURAL at 12:30

## 2024-02-08 RX ADMIN — MIDAZOLAM HYDROCHLORIDE 2 MG: 1 INJECTION, SOLUTION INTRAMUSCULAR; INTRAVENOUS at 11:13

## 2024-02-08 RX ADMIN — PROPOFOL 200 MG: 10 INJECTION, EMULSION INTRAVENOUS at 12:30

## 2024-02-08 RX ADMIN — SODIUM CHLORIDE, POTASSIUM CHLORIDE, SODIUM LACTATE AND CALCIUM CHLORIDE: 600; 310; 30; 20 INJECTION, SOLUTION INTRAVENOUS at 12:28

## 2024-02-08 RX ADMIN — CEFAZOLIN 2000 MG: 2 INJECTION, POWDER, FOR SOLUTION INTRAMUSCULAR; INTRAVENOUS at 12:35

## 2024-02-08 RX ADMIN — LIDOCAINE HYDROCHLORIDE 10 ML: 20 INJECTION, SOLUTION INFILTRATION; PERINEURAL at 11:17

## 2024-02-08 RX ADMIN — MIDAZOLAM HYDROCHLORIDE 2 MG: 2 INJECTION, SOLUTION INTRAMUSCULAR; INTRAVENOUS at 11:13

## 2024-02-08 RX ADMIN — GLYCOPYRROLATE 0.4 MG: 0.2 INJECTION INTRAMUSCULAR; INTRAVENOUS at 12:35

## 2024-02-08 RX ADMIN — ROPIVACAINE HYDROCHLORIDE 10 ML: 5 INJECTION, SOLUTION EPIDURAL; INFILTRATION; PERINEURAL at 11:17

## 2024-02-08 RX ADMIN — Medication 20 MG: at 12:51

## 2024-02-08 RX ADMIN — FENTANYL CITRATE 100 MCG: 50 INJECTION, SOLUTION INTRAMUSCULAR; INTRAVENOUS at 12:30

## 2024-02-08 ASSESSMENT — PAIN DESCRIPTION - LOCATION
LOCATION: BACK
LOCATION: BACK

## 2024-02-08 ASSESSMENT — PAIN DESCRIPTION - DESCRIPTORS
DESCRIPTORS: ACHING

## 2024-02-08 ASSESSMENT — PAIN SCALES - GENERAL
PAINLEVEL_OUTOF10: 2
PAINLEVEL_OUTOF10: 2

## 2024-02-08 ASSESSMENT — PAIN DESCRIPTION - PAIN TYPE
TYPE: CHRONIC PAIN
TYPE: CHRONIC PAIN

## 2024-02-08 ASSESSMENT — PAIN - FUNCTIONAL ASSESSMENT
PAIN_FUNCTIONAL_ASSESSMENT: NONE - DENIES PAIN
PAIN_FUNCTIONAL_ASSESSMENT: 0-10

## 2024-02-08 NOTE — DISCHARGE INSTRUCTIONS
Activity-no weightbearing right upper extremity.  Okay to move fingers    Dressing-keep splint clean dry and intact at all times.    May use ice at least 20 minutes every 1-2 hours    Keep sling intact while block continues to work & when up & about    Call for fever>101, redness up arm, excessive swelling, drainage, increased pain not controlled with current meds,elevation,etc.; persistent nausea or vomiting; color changes in finger-cold,pale,dusky, new numbness or tingling once block wears off; any other questions or concerns    Activity  You have had anesthesia today  Do not drive, operate heavy equipment, consume alcoholic beverages, or make any important decisions  for 24 hours   If you are taking pain medication: Do not drive or consume alcohol.  Take your time changing positions today. You may feel light headed or dizzy if you move too quickly.   Continue your home medications as ordered by your physician.  Diet   You can eat your normal diet when you feel well. You should start off with bland foods like chicken soup, toast, or yogurt. Then advance as tolerated.  Drink plenty of fluids (unless your doctor tells you not to). Your urine should be very lightly colored without a strong odor.

## 2024-02-08 NOTE — ANESTHESIA PROCEDURE NOTES
Peripheral Block    Patient location during procedure: pre-op  Reason for block: post-op pain management and at surgeon's request  Start time: 2/8/2024 11:15 AM  End time: 2/8/2024 11:17 AM  Staffing  Performed: anesthesiologist   Anesthesiologist: Neto Rothman MD  Performed by: Neto Rothman MD  Authorized by: Neto Rothman MD    Preanesthetic Checklist  Completed: patient identified, IV checked, site marked, risks and benefits discussed, surgical/procedural consents, equipment checked, pre-op evaluation, timeout performed, anesthesia consent given, oxygen available, monitors applied/VS acknowledged, fire risk safety assessment completed and verbalized and blood product R/B/A discussed and consented  Peripheral Block   Patient position: sitting  Prep: ChloraPrep  Provider prep: mask and sterile gloves  Patient monitoring: cardiac monitor, continuous pulse ox, frequent blood pressure checks, IV access, oxygen and responsive to questions  Block type: Brachial plexus  Supraclavicular  Laterality: right  Injection technique: single-shot  Guidance: ultrasound guided    Needle   Needle type: insulated echogenic nerve stimulator needle   Needle gauge: 22 G  Needle localization: anatomical landmarks and ultrasound guidance  Needle length: 2 IN.  Assessment   Injection assessment: negative aspiration for heme, no paresthesia on injection, local visualized surrounding nerve on ultrasound and no intravascular symptoms  Paresthesia pain: none  Slow fractionated injection: yes  Hemodynamics: stableno  Outcomes: uncomplicated and patient tolerated procedure well    Additional Notes  4MG DECADRON ADDED TO LOCAL ANESTHETIC SOLUTION  Medications Administered  lidocaine injection 2% - Perineural   10 mL - 2/8/2024 11:17:00 AM  ropivacaine (NAROPIN) injection 0.5% - Perineural   10 mL - 2/8/2024 11:17:00 AM

## 2024-02-08 NOTE — ANESTHESIA POSTPROCEDURE EVALUATION
Department of Anesthesiology  Postprocedure Note    Patient: Saravanan Schaefer  MRN: [id number]  YOB: 1950  Date of evaluation: 2/8/2024    Procedure Summary     Date: 02/08/24 Room / Location: [address]    Anesthesia Start: 1228 Anesthesia Stop: 1433    Procedure: RIGHT WRIST PI ENTERECTOMY PROXIMAL ROW CARPECTOMY WITH CAPITATE RESURFACING WITH ARTHROSURFACE BORA-CAPITATE (Right: Wrist) Diagnosis:       Arthritis of right wrist      (Arthritis of right wrist [M19.031])    Surgeons: Shan Salter MD Responsible Provider: Neto Rothman MD    Anesthesia Type: general, regional ASA Status: 2          Anesthesia Type: No value filed.    Neeta Phase I: Neeta Score: 10    Neeta Phase II: Neeta Score: 10    Anesthesia Post Evaluation    Patient location during evaluation: PACU  Patient participation: complete - patient participated  Level of consciousness: awake and alert  Airway patency: patent  Nausea & Vomiting: no nausea and no vomiting  Cardiovascular status: blood pressure returned to baseline  Respiratory status: acceptable and room air  Hydration status: euvolemic  Pain management: adequate and satisfactory to patient        No notable events documented.

## 2024-02-08 NOTE — OP NOTE
Operative Note      Patient: Saravanan Schaefer  YOB: 1950  MRN: [id number]    Date of Procedure: 2/8/2024    Pre-Op Diagnosis Codes:     * Arthritis of right wrist [M19.031]    Post-Op Diagnosis: Same       Procedure-right wrist proximal row carpectomy with capitate resurfacing, and posterior interosseous nerve neurectomy    Surgeon(s):  Shan Salter MD    Assistant:   First Assistant: Evelin Simmons RN    Anesthesia: Regional    Estimated Blood Loss (mL): less than 50     Complications: None    Specimens:   * No specimens in log *    Implants:  Implant Name Type Inv. Item Serial No.  Lot No. LRB No. Used Action   WRISTMOTION 15MM CAPITATE COMPONENT [id number]    [employer]FACE [employer] [id number] Right 1 Implanted         Drains: * No LDAs found *    Findings: Arthritis        Detailed Description of Procedure:   This is a 73-year-old male who has right wrist arthritis.  We have discussed with him the risks and benefits of this procedure and he has elected with this today.  Patient was brought to the operating room placed in supine position and received general anesthesia.  His right upper extremity was then prepped and draped in sterile fashion.  Timeout was performed ensuring correct patient correct extremity and correct procedure.  Preoperative antibiotics were assured with 2 g of Ancef.  We used an Esmarch to examine the right upper extremity inflated tourniquet in the upper arm to 250 m mercury.    I made incision over the dorsal aspect of the wrist between the third and fourth dorsal compartments.  Incised sharply through skin came down through subcu tissue.  Then came down to the extensor retinaculum.  I freed up the extensor pollicis longus tendon.  I then freed up the fourth dorsal compartment.  I then identified the posterior interosseous nerve and the floor the fourth dorsal compartment and cauterize this proximal to the joint line.  Approximately a centimeter.  I then came down

## 2024-02-08 NOTE — H&P
ORTHOPEDIC PATIENT EVALUATION      HPI / Chief Complaint  Saravanan Schaefer is a 73 y.o. male who presents for right wrist arthritis    Past Medical History  Saravanan  has a past medical history of Acid indigestion, Cancer (HCC), Chronic back pain, Hyperlipidemia, Hypertension, NATALY (obstructive sleep apnea), Osteoarthritis, and Retention of urine.    Past Surgical History  Saravanan  has a past surgical history that includes back surgery (2014); Hand surgery (Right); Hand tendon surgery (Left); Colonoscopy; hernia repair; skin biopsy; and Cervical spine surgery.    Current Medications  No current facility-administered medications for this encounter.     Current Outpatient Medications   Medication Sig Dispense Refill    citalopram (CELEXA) 20 MG tablet take 1 tablet by mouth once daily 90 tablet 1    lisinopril (PRINIVIL;ZESTRIL) 20 MG tablet take 1 tablet by mouth once daily 90 tablet 1    traZODone (DESYREL) 50 MG tablet Take 1 tablet by mouth nightly 90 tablet 0    dicyclomine (BENTYL) 20 MG tablet take 1 tablet by mouth twice a day 120 tablet 0    simvastatin (ZOCOR) 40 MG tablet take 1 tablet by mouth at bedtime 90 tablet 0    meloxicam (MOBIC) 15 MG tablet take 1 tablet by mouth once daily 90 tablet 1    ciprofloxacin (CIPRO) 500 MG tablet       metroNIDAZOLE (FLAGYL) 500 MG tablet       amLODIPine (NORVASC) 10 MG tablet take 1 tablet by mouth once daily 90 tablet 1    predniSONE (DELTASONE) 20 MG tablet Take two tablets daily in the AM for five days with food. (Patient not taking: Reported on 2/1/2024) 10 tablet 0    RA COL-RITE 100 MG capsule Take 1 capsule by mouth 2 times daily  0    Lactobacillus TABS Take 4 mg by mouth 2 times daily      esomeprazole Magnesium (NEXIUM) 40 MG PACK Take 1 packet by mouth daily 90 packet 0       Allergies  Allergies have been reviewed.  Saravanan has No Known Allergies.    Social History  Saravanan  reports that he quit smoking about 45 years ago. His smoking use included cigarettes. He

## 2024-02-08 NOTE — ANESTHESIA PRE PROCEDURE
Provider Last Rate Last Admin   • lidocaine PF 1 % injection 1 mL  1 mL IntraDERmal Once PRN Neto Rothman MD       • lactated ringers IV soln infusion   IntraVENous Continuous Neto Rothman MD       • sodium chloride flush 0.9 % injection 5-40 mL  5-40 mL IntraVENous 2 times per day Neto Rothman MD       • sodium chloride flush 0.9 % injection 5-40 mL  5-40 mL IntraVENous PRN Neto Rothman MD       • 0.9 % sodium chloride infusion   IntraVENous PRN Neto Rothman MD       • ceFAZolin (ANCEF) 2,000 mg in sodium chloride 0.9 % 50 mL IVPB (mini-bag)  2,000 mg IntraVENous On Call to OR Shan Salter MD       • sodium chloride flush 0.9 % injection 5-40 mL  5-40 mL IntraVENous 2 times per day Shan Salter MD       • sodium chloride flush 0.9 % injection 5-40 mL  5-40 mL IntraVENous PRN Shan Salter MD       • 0.9 % sodium chloride infusion   IntraVENous PRN Shan Salter MD       • ceFAZolin (ANCEF) 2 g injection                Allergies:  No Known Allergies    Problem List:    Patient Active Problem List   Diagnosis Code   • Acute back pain with sciatica M54.40   • Pure hypercholesterolemia E78.00   • Osteoarthritis of finger M19.049   • Gastroesophageal reflux disease without esophagitis K21.9   • Primary insomnia F51.01   • Actinic keratosis L57.0   • Essential hypertension I10   • Obstructive sleep apnea syndrome G47.33   • Benign prostatic hyperplasia with urinary obstruction N40.1, N13.8   • Hypernatremia E87.0   • Diverticulitis K57.92   • Mood disorder (HCC) F39   • Hyperosmolality and hypernatremia E87.0   • Sleep walking F51.3   • Arthritis of carpometacarpal (CMC) joint of both thumbs M18.0   • Carpal tunnel syndrome of right wrist G56.01   • Dupuytren's disease of palm of both hands M72.0       Past Medical History:        Diagnosis Date   • Acid indigestion    • Cancer (HCC)     skin   • Chronic back pain    • Hyperlipidemia    • Hypertension    • NATALY (obstructive sleep

## 2024-02-21 ENCOUNTER — OFFICE VISIT (OUTPATIENT)
Age: 74
End: 2024-02-21

## 2024-02-21 VITALS — BODY MASS INDEX: 25.23 KG/M2 | HEIGHT: 66 IN | WEIGHT: 157 LBS

## 2024-02-21 DIAGNOSIS — M25.531 RIGHT WRIST PAIN: Primary | ICD-10-CM

## 2024-02-21 PROCEDURE — 99024 POSTOP FOLLOW-UP VISIT: CPT | Performed by: ORTHOPAEDIC SURGERY

## 2024-02-21 RX ORDER — IBUPROFEN 800 MG/1
800 TABLET ORAL EVERY 6 HOURS PRN
Qty: 90 TABLET | Refills: 0 | Status: SHIPPED | OUTPATIENT
Start: 2024-02-21 | End: 2024-05-21

## 2024-02-21 NOTE — PROGRESS NOTES
[facility]      [facility] PHYSICIANS [facility], [facility]  [facility] ORTHOPAEDICS AND SPORTS MEDICINE  [address]  [place] [address]     Surgery:    2/8/2024  Right Wrist Pi Enterectomy Proximal Row Carpectomy With Capitate Resurfacing With Arthrosurface Jermaine-capitate - Right    History of Present Illness:    This is a 73 y.o. male who presents to the clinic today for post op follow up for Right Wrist Pi Enterectomy Proximal Row Carpectomy With Capitate Resurfacing With Arthrosurface Jermaine-capitate - Right on 2/8/2024 .  Doing well postoperative no complications.    On examination incision is well-healed.  There is some swelling present around the wrist.  He has some stiffness in the fingers not making quite a full fist.  Full extension intact.  Wrist range of motion is very stiff today.    XR WRIST RIGHT (MIN 3 VIEWS)    Result Date: 2/21/2024  X-rays of the right wrist show implant in good position the capitate is sitting nicely in the lunate facet.       At this point I think he is doing well we will went over gentle range of motion routine that he can do at home.  No lifting more than just a pound with the brace on.  He is fine to use the hand wear for activities of daily living with his brace on.  Will see him back in 4 weeks          Electronically signed by Shan Salter MD on 2/21/2024 at 12:06 PM

## 2024-03-20 ENCOUNTER — OFFICE VISIT (OUTPATIENT)
Age: 74
End: 2024-03-20

## 2024-03-20 VITALS — HEIGHT: 66 IN | WEIGHT: 157 LBS | BODY MASS INDEX: 25.23 KG/M2

## 2024-03-20 DIAGNOSIS — Z48.89 POSTOPERATIVE VISIT: Primary | ICD-10-CM

## 2024-03-20 PROCEDURE — 99024 POSTOP FOLLOW-UP VISIT: CPT | Performed by: ORTHOPAEDIC SURGERY

## 2024-03-20 NOTE — PROGRESS NOTES
[facility] Orthopedics & Sports Medicine      [facility] PHYSICIANS [facility], [facility]  [facility] ORTHOPAEDICS AND SPORTS MEDICINE  [address]  [place] [address]     Surgery:    2/8/2024  Right Wrist Pi Enterectomy Proximal Row Carpectomy With Capitate Resurfacing With Arthrosurface Jermaine-capitate - Right    History of Present Illness:    This is a 73 y.o. male who presents to the clinic today for post op follow up for Right Wrist Pi Enterectomy Proximal Row Carpectomy With Capitate Resurfacing With Arthrosurface Jermaine-capitate - Right on 2/8/2024 .  Patient got 6 weeks postop at this point.  He is doing very well overall.  He does state that he gets occasional aching in the wrist with wrist extension.  He takes ibuprofen on occasion for the pain.  He presents today for further evaluation.    Physical exam:  Right hand: Patient able to flex and extend the fingers.  He does have a palpable full Dupuytren cord in the palmar aspect of the hand.  Sensation intact light touch.  He is able to flex his wrist but has some tightness with wrist extension.  The incision appears to be very well-healed at this point.    Plan:  Patient is 6 weeks postop from a right wrist proximal row carpectomy with capitate resurfacing.  He is doing very well overall at this point we encouraged him to continue range of motion exercises.  We did tell him that we expect that the inflammation and pain should dissipate over time.  Will see him back as needed.  The patient demonstrates understanding and is agreeable with the plan.    Attending Physician Statement   I, Shan Salter MD, have seen and discussed the care of Saravanan Schaefer  including pertinent history and exam findings, with Aida Pruett PA-C. I have reviewed the key elements of all parts of the encounter with the physician assistant at the time of the encounter. I either performed the key elements of the history and physical exam myself or was

## 2024-05-28 ENCOUNTER — OFFICE VISIT (OUTPATIENT)
Age: 74
End: 2024-05-28

## 2024-05-28 VITALS — BODY MASS INDEX: 25.07 KG/M2 | WEIGHT: 156 LBS | HEIGHT: 66 IN

## 2024-05-28 DIAGNOSIS — M65.312 TRIGGER FINGER OF LEFT THUMB: ICD-10-CM

## 2024-05-28 DIAGNOSIS — M25.532 BILATERAL WRIST PAIN: Primary | ICD-10-CM

## 2024-05-28 DIAGNOSIS — M25.531 BILATERAL WRIST PAIN: Primary | ICD-10-CM

## 2024-05-28 RX ORDER — IBUPROFEN 800 MG/1
800 TABLET ORAL EVERY 6 HOURS PRN
Qty: 90 TABLET | Refills: 1 | Status: SHIPPED | OUTPATIENT
Start: 2024-05-28

## 2024-05-28 RX ORDER — METHYLPREDNISOLONE 4 MG/1
TABLET ORAL
Qty: 42 TABLET | Refills: 0 | Status: SHIPPED | OUTPATIENT
Start: 2024-05-28

## 2024-05-28 NOTE — PROGRESS NOTES
[facility] Orthopedics & Sports Medicine      [facility] PHYSICIANS [facility], [facility]  [facility] ORTHOPAEDICS AND SPORTS MEDICINE  [address]  [place] [address]  Dept: [phone]  Dept Fax: [phone]    Chief Compliant:  Chief Complaint   Patient presents with    Post-Op Check     Right wrist          History of Present Illness:  This is a 73 y.o. male who presents to the clinic today for evaluation / follow up of right wrist pain and left hand pain and stiffness.  He states that just about 3 weeks ago both sides flared up for really no apparent reason.  He is status post proximal row carpectomy and capitate resurfacing on the right.  This surgery was in February so he is approximately just over 3 months out.  He states he was doing very well until last 2 weeks or so.    .       Physical Exam:    On exam today the right wrist does not really have any effusion to it he tolerates wrist range of motion with flexion extension well with no real discomfort.  He has tenderness over the ulnar aspect of the wrist over the TFCC area.  He has some generalized tenderness throughout the wrist but again not localized over the surgical site.  He has some discomfort as I low the CMC joint of the thumb with some pain and crepitus here.    The left hand he has stiffness generalized throughout the digits.  He has active triggering of the left thumb through range of motion.  No obvious swelling.  Cap of less than 2 seconds sensation tact light touch    Nursing note and vitals reviewed.     Labs and Imaging:     XR taken today:  XR WRIST LEFT (MIN 3 VIEWS)    Result Date: 5/28/2024  X-rays of the left wrist show that he does have a widening of the scapholunate ligament interval with some narrowing of the radial scaphoid articulation.  No other acute process.     XR WRIST RIGHT (MIN 3 VIEWS)    Result Date: 5/28/2024  X-rays of the right wrist show implant in good position sitting well in the

## 2024-05-29 RX ORDER — METHYLPREDNISOLONE ACETATE 40 MG/ML
40 INJECTION, SUSPENSION INTRA-ARTICULAR; INTRALESIONAL; INTRAMUSCULAR; SOFT TISSUE ONCE
Status: SHIPPED | OUTPATIENT
Start: 2024-05-29

## 2024-05-29 RX ORDER — LIDOCAINE HYDROCHLORIDE 10 MG/ML
1 INJECTION, SOLUTION INFILTRATION; PERINEURAL ONCE
Status: SHIPPED | OUTPATIENT
Start: 2024-05-29

## 2024-06-25 ENCOUNTER — OFFICE VISIT (OUTPATIENT)
Age: 74
End: 2024-06-25
Payer: MEDICARE

## 2024-06-25 VITALS — WEIGHT: 156 LBS | HEIGHT: 66 IN | BODY MASS INDEX: 25.07 KG/M2

## 2024-06-25 DIAGNOSIS — M79.642 PAIN OF LEFT HAND: Primary | ICD-10-CM

## 2024-06-25 PROCEDURE — 3017F COLORECTAL CA SCREEN DOC REV: CPT | Performed by: ORTHOPAEDIC SURGERY

## 2024-06-25 PROCEDURE — 1036F TOBACCO NON-USER: CPT | Performed by: ORTHOPAEDIC SURGERY

## 2024-06-25 PROCEDURE — 99214 OFFICE O/P EST MOD 30 MIN: CPT | Performed by: ORTHOPAEDIC SURGERY

## 2024-06-25 PROCEDURE — G8427 DOCREV CUR MEDS BY ELIG CLIN: HCPCS | Performed by: ORTHOPAEDIC SURGERY

## 2024-06-25 PROCEDURE — G8417 CALC BMI ABV UP PARAM F/U: HCPCS | Performed by: ORTHOPAEDIC SURGERY

## 2024-06-25 PROCEDURE — 1123F ACP DISCUSS/DSCN MKR DOCD: CPT | Performed by: ORTHOPAEDIC SURGERY

## 2024-06-25 NOTE — PROGRESS NOTES
[facility] Orthopedics & Sports Medicine      [facility] PHYSICIANS [facility], [facility]  [facility] ORTHOPAEDICS AND SPORTS MEDICINE  [address]  [place] [address]  Dept: [phone]  Dept Fax: [phone]    Chief Compliant:  Chief Complaint   Patient presents with    Follow-up     R wrist fx, 4wk        History of Present Illness:  This is a 73 y.o. male who presents to the clinic today for evaluation / follow up of follow-up of left hand pain and right wrist postop.  He states that the left hand feels much better.  He still has some triggering in the ring finger.  He states it is very mild and it is not on a consistent basis.  Overall the swelling and discomfort he had in the left hand is much better after course of steroids.  He states in the right wrist which she had a proximal row carpectomy and capitate resurfacing the pain is much better than it was prior to surgery but he still gets some discomfort over the ulnar aspect of the wrist.  He does state that this has improved over the course of months where was much more tender couple months ago.  At this point he likes to wear a neoprene sleeve over his does feel that it helps.  He states it is very tolerable and overall improving.       Physical Exam:    On examination today the left hand he has some only occasional triggering of the ring finger.  No real tenderness over the A1 pulley he has some arthritis in the hand with some stiffness in the knuckles no obvious synovitis.    On the right wrist the swelling has decreased significantly since we last saw him.  Has some minor discomfort over the TFCC area.  Tolerates wrist flexion extension well with no crepitus and no discomfort.    Nursing note and vitals reviewed.     Labs and Imaging:     XR taken today:  No results found.      No orders of the defined types were placed in this encounter.      Assessment and Plan:  No diagnosis found.      This is a 73 y.o. male with left

## 2024-11-20 ENCOUNTER — OFFICE VISIT (OUTPATIENT)
Dept: PRIMARY CARE CLINIC | Age: 74
End: 2024-11-20
Payer: MEDICARE

## 2024-11-20 VITALS
WEIGHT: 159 LBS | BODY MASS INDEX: 25.55 KG/M2 | DIASTOLIC BLOOD PRESSURE: 62 MMHG | OXYGEN SATURATION: 95 % | SYSTOLIC BLOOD PRESSURE: 130 MMHG | HEIGHT: 66 IN | HEART RATE: 68 BPM

## 2024-11-20 DIAGNOSIS — Z91.81 PERSONAL HISTORY OF FALL: ICD-10-CM

## 2024-11-20 DIAGNOSIS — F51.01 PRIMARY INSOMNIA: ICD-10-CM

## 2024-11-20 DIAGNOSIS — Z12.5 PROSTATE CANCER SCREENING: ICD-10-CM

## 2024-11-20 DIAGNOSIS — Z76.89 ESTABLISHING CARE WITH NEW DOCTOR, ENCOUNTER FOR: ICD-10-CM

## 2024-11-20 DIAGNOSIS — I10 ESSENTIAL HYPERTENSION: Primary | ICD-10-CM

## 2024-11-20 DIAGNOSIS — Z87.891 FORMER CIGARETTE SMOKER: ICD-10-CM

## 2024-11-20 DIAGNOSIS — K21.9 GASTROESOPHAGEAL REFLUX DISEASE WITHOUT ESOPHAGITIS: ICD-10-CM

## 2024-11-20 DIAGNOSIS — E78.00 PURE HYPERCHOLESTEROLEMIA: ICD-10-CM

## 2024-11-20 DIAGNOSIS — F41.1 GAD (GENERALIZED ANXIETY DISORDER): ICD-10-CM

## 2024-11-20 DIAGNOSIS — G47.33 OBSTRUCTIVE SLEEP APNEA SYNDROME: ICD-10-CM

## 2024-11-20 PROBLEM — F51.3 SLEEP WALKING: Status: RESOLVED | Noted: 2020-10-30 | Resolved: 2024-11-20

## 2024-11-20 PROCEDURE — 3017F COLORECTAL CA SCREEN DOC REV: CPT | Performed by: STUDENT IN AN ORGANIZED HEALTH CARE EDUCATION/TRAINING PROGRAM

## 2024-11-20 PROCEDURE — 1036F TOBACCO NON-USER: CPT | Performed by: STUDENT IN AN ORGANIZED HEALTH CARE EDUCATION/TRAINING PROGRAM

## 2024-11-20 PROCEDURE — G8484 FLU IMMUNIZE NO ADMIN: HCPCS | Performed by: STUDENT IN AN ORGANIZED HEALTH CARE EDUCATION/TRAINING PROGRAM

## 2024-11-20 PROCEDURE — 3075F SYST BP GE 130 - 139MM HG: CPT | Performed by: STUDENT IN AN ORGANIZED HEALTH CARE EDUCATION/TRAINING PROGRAM

## 2024-11-20 PROCEDURE — 99204 OFFICE O/P NEW MOD 45 MIN: CPT | Performed by: STUDENT IN AN ORGANIZED HEALTH CARE EDUCATION/TRAINING PROGRAM

## 2024-11-20 PROCEDURE — G8417 CALC BMI ABV UP PARAM F/U: HCPCS | Performed by: STUDENT IN AN ORGANIZED HEALTH CARE EDUCATION/TRAINING PROGRAM

## 2024-11-20 PROCEDURE — 3078F DIAST BP <80 MM HG: CPT | Performed by: STUDENT IN AN ORGANIZED HEALTH CARE EDUCATION/TRAINING PROGRAM

## 2024-11-20 PROCEDURE — G8427 DOCREV CUR MEDS BY ELIG CLIN: HCPCS | Performed by: STUDENT IN AN ORGANIZED HEALTH CARE EDUCATION/TRAINING PROGRAM

## 2024-11-20 PROCEDURE — 1123F ACP DISCUSS/DSCN MKR DOCD: CPT | Performed by: STUDENT IN AN ORGANIZED HEALTH CARE EDUCATION/TRAINING PROGRAM

## 2024-11-20 RX ORDER — TRAZODONE HYDROCHLORIDE 50 MG/1
50 TABLET, FILM COATED ORAL NIGHTLY
Qty: 90 TABLET | Refills: 0 | Status: SHIPPED | OUTPATIENT
Start: 2024-11-20 | End: 2025-02-18

## 2024-11-20 NOTE — PROGRESS NOTES
[facility]  [address] [place] [address].  [place] [address]  Phone: [phone]  Fax: [phone]    Office Visit Note    Date of Visit: 2024   Patient Name: Saravanan Schaefer   Patient :  1950     ASSESSMENT/PLAN   1. Essential hypertension  Overview:  Currently on Lisinopril, Amlodipine, managed by PCP.  Assessment & Plan:  -No refills needed today  -Hypertension lab panel ordered today  -Follow-up in 4 weeks to discuss lab results  Orders:  -     CBC with Auto Differential; Future  -     Microalbumin, Ur; Future  -     Basic Metabolic Panel; Future  -     Lipid Panel; Future  2. Primary insomnia  Overview:  Currently on trazodone, managed by PCP.  Assessment & Plan:  -Patient has been on trazodone for a while now, has issues falling asleep without it  -Previously on 100 mg dose, but patient would like to taper back down to 50 mg-prescription sent to pharmacy  Orders:  -     traZODone (DESYREL) 50 MG tablet; Take 1 tablet by mouth nightly, Disp-90 tablet, R-0To replace Trazodone 100mg dose.Normal  3. Obstructive sleep apnea syndrome  Overview:  Can't tolerate CPAP machine. Uses sleep strips and sleeps on his side  Assessment & Plan:  -Discussed that unmanaged sleep apnea can worsen hypertension, put strain on heart  -Briefly discussed possible pulmonology/sleep medicine referral in the future  4. ARUNA (generalized anxiety disorder)  Overview:  Currently on Celexa, managed by PCP.  Assessment & Plan:  -stable. Continue current treatment plan  5. Gastroesophageal reflux disease without esophagitis  Overview:  Currently on Nexium, managed by PCP.  Assessment & Plan:  -Has been on PCP for several years now  -Because of prolonged PPI use, will check magnesium level  Orders:  -     Magnesium; Future  6. Pure hypercholesterolemia  Overview:  Currently on simvastatin, managed by PCP.  Assessment & Plan:  -Fasting lipid panel ordered today  -Will discuss results at follow-up appointment in 4

## 2024-11-20 NOTE — PATIENT INSTRUCTIONS
I think that seeing the [facility] clinic would be helpful for you to get some more stability, especially since you've had several falls. Their phone number is [phone]. They don't require referrals, so you should be able to schedule an appointment without any issues.

## 2024-12-03 DIAGNOSIS — M79.642 PAIN IN BOTH HANDS: ICD-10-CM

## 2024-12-03 DIAGNOSIS — E78.2 MIXED HYPERLIPIDEMIA: ICD-10-CM

## 2024-12-03 DIAGNOSIS — M79.641 PAIN IN BOTH HANDS: ICD-10-CM

## 2024-12-05 RX ORDER — MELOXICAM 15 MG/1
TABLET ORAL
Qty: 90 TABLET | Refills: 0 | Status: SHIPPED | OUTPATIENT
Start: 2024-12-05

## 2024-12-05 RX ORDER — SIMVASTATIN 40 MG
TABLET ORAL
Qty: 90 TABLET | Refills: 0 | Status: SHIPPED | OUTPATIENT
Start: 2024-12-05

## 2024-12-16 ENCOUNTER — HOSPITAL ENCOUNTER (OUTPATIENT)
Age: 74
Setting detail: SPECIMEN
Discharge: HOME | End: 2024-12-16

## 2024-12-16 DIAGNOSIS — E78.00 PURE HYPERCHOLESTEROLEMIA: ICD-10-CM

## 2024-12-16 DIAGNOSIS — K21.9 GASTROESOPHAGEAL REFLUX DISEASE WITHOUT ESOPHAGITIS: ICD-10-CM

## 2024-12-16 DIAGNOSIS — I10 ESSENTIAL HYPERTENSION: ICD-10-CM

## 2024-12-16 DIAGNOSIS — Z12.5 PROSTATE CANCER SCREENING: ICD-10-CM

## 2024-12-16 LAB
ANION GAP SERPL CALCULATED.3IONS-SCNC: 9 MMOL/L (ref 9–16)
BASOPHILS # BLD: 0.04 K/UL (ref 0–0.2)
BASOPHILS NFR BLD: 1 % (ref 0–2)
BUN SERPL-MCNC: 11 MG/DL (ref 8–23)
CALCIUM SERPL-MCNC: 9.2 MG/DL (ref 8.6–10.4)
CHLORIDE SERPL-SCNC: 104 MMOL/L (ref 98–107)
CHOLEST SERPL-MCNC: 194 MG/DL (ref 0–199)
CHOLESTEROL/HDL RATIO: 2.8
CO2 SERPL-SCNC: 27 MMOL/L (ref 20–31)
CREAT SERPL-MCNC: 0.7 MG/DL (ref 0.7–1.2)
CREAT UR-MCNC: 84.4 MG/DL (ref 39–259)
EOSINOPHIL # BLD: 0.22 K/UL (ref 0–0.44)
EOSINOPHILS RELATIVE PERCENT: 3 % (ref 1–4)
ERYTHROCYTE [DISTWIDTH] IN BLOOD BY AUTOMATED COUNT: 13.4 % (ref 11.8–14.4)
GFR, ESTIMATED: >90 ML/MIN/1.73M2
GLUCOSE SERPL-MCNC: 117 MG/DL (ref 74–99)
HCT VFR BLD AUTO: 44.4 % (ref 40.7–50.3)
HDLC SERPL-MCNC: 69 MG/DL
HGB BLD-MCNC: 13.8 G/DL (ref 13–17)
IMM GRANULOCYTES # BLD AUTO: <0.03 K/UL (ref 0–0.3)
IMM GRANULOCYTES NFR BLD: 0 %
LDLC SERPL CALC-MCNC: 103 MG/DL (ref 0–100)
LYMPHOCYTES NFR BLD: 3.38 K/UL (ref 1.1–3.7)
LYMPHOCYTES RELATIVE PERCENT: 48 % (ref 24–43)
MAGNESIUM SERPL-MCNC: 2.4 MG/DL (ref 1.6–2.4)
MCH RBC QN AUTO: 27.5 PG (ref 25.2–33.5)
MCHC RBC AUTO-ENTMCNC: 31.1 G/DL (ref 28.4–34.8)
MCV RBC AUTO: 88.4 FL (ref 82.6–102.9)
MICROALBUMIN UR-MCNC: <12 MG/L (ref 0–20)
MICROALBUMIN/CREAT UR-RTO: NORMAL MCG/MG CREAT (ref 0–17)
MONOCYTES NFR BLD: 0.72 K/UL (ref 0.1–1.2)
MONOCYTES NFR BLD: 10 % (ref 3–12)
NEUTROPHILS NFR BLD: 38 % (ref 36–65)
NEUTS SEG NFR BLD: 2.72 K/UL (ref 1.5–8.1)
NRBC BLD-RTO: 0 PER 100 WBC
PLATELET # BLD AUTO: 269 K/UL (ref 138–453)
PMV BLD AUTO: 11.5 FL (ref 8.1–13.5)
POTASSIUM SERPL-SCNC: 4.8 MMOL/L (ref 3.7–5.3)
PSA SERPL-MCNC: 2.2 NG/ML (ref 0–4)
RBC # BLD AUTO: 5.02 M/UL (ref 4.21–5.77)
SODIUM SERPL-SCNC: 140 MMOL/L (ref 136–145)
TRIGL SERPL-MCNC: 108 MG/DL
VLDLC SERPL CALC-MCNC: 22 MG/DL (ref 1–30)
WBC OTHER # BLD: 7.1 K/UL (ref 3.5–11.3)

## 2024-12-18 ENCOUNTER — OFFICE VISIT (OUTPATIENT)
Dept: PRIMARY CARE CLINIC | Age: 74
End: 2024-12-18

## 2024-12-18 VITALS
HEIGHT: 66 IN | SYSTOLIC BLOOD PRESSURE: 110 MMHG | HEART RATE: 74 BPM | BODY MASS INDEX: 26.2 KG/M2 | DIASTOLIC BLOOD PRESSURE: 62 MMHG | WEIGHT: 163 LBS | OXYGEN SATURATION: 97 %

## 2024-12-18 DIAGNOSIS — R73.01 ELEVATED FASTING GLUCOSE: ICD-10-CM

## 2024-12-18 DIAGNOSIS — M62.08 DIASTASIS RECTI: ICD-10-CM

## 2024-12-18 DIAGNOSIS — K57.90 DIVERTICULOSIS: ICD-10-CM

## 2024-12-18 DIAGNOSIS — Z79.899 MEDICATION MANAGEMENT: ICD-10-CM

## 2024-12-18 DIAGNOSIS — R73.03 PREDIABETES: Primary | ICD-10-CM

## 2024-12-18 LAB — HBA1C MFR BLD: 5.7 %

## 2024-12-18 RX ORDER — DICYCLOMINE HYDROCHLORIDE 10 MG/1
CAPSULE ORAL
Qty: 42 CAPSULE | Refills: 0 | Status: SHIPPED | OUTPATIENT
Start: 2024-12-18 | End: 2025-01-15

## 2024-12-18 NOTE — PROGRESS NOTES
12/16/2024 0.7  0.7 - 1.2 mg/dL Final    Est, Glom Filt Rate 12/16/2024 >90  >60 mL/min/1.73m2 Final    Comment:       These results are not intended for use in patients <18 years of age.        eGFR results are calculated without a race factor using the 2021 CKD-EPI equation.  Careful clinical correlation is recommended, particularly when comparing to results   calculated using previous equations.  The CKD-EPI equation is less accurate in patients with extremes of muscle mass, extra-renal   metabolism of creatine, excessive creatine ingestion, or following therapy that affects   renal tubular secretion.      Calcium 12/16/2024 9.2  8.6 - 10.4 mg/dL Final    Albumin Urine 12/16/2024 <12  0 - 20 mg/L Final    Creatinine, Ur 12/16/2024 84.4  39.0 - 259.0 mg/dL Final    Reference range defined for 1st morning urine    Microalb/Crt. Ratio 12/16/2024 Can not be calculated  0.0 - 17.0 mcg/mg creat Final    WBC 12/16/2024 7.1  3.5 - 11.3 k/uL Final    RBC 12/16/2024 5.02  4.21 - 5.77 m/uL Final    Hemoglobin 12/16/2024 13.8  13.0 - 17.0 g/dL Final    Hematocrit 12/16/2024 44.4  40.7 - 50.3 % Final    MCV 12/16/2024 88.4  82.6 - 102.9 fL Final    MCH 12/16/2024 27.5  25.2 - 33.5 pg Final    MCHC 12/16/2024 31.1  28.4 - 34.8 g/dL Final    RDW 12/16/2024 13.4  11.8 - 14.4 % Final    Platelets 12/16/2024 269  138 - 453 k/uL Final    MPV 12/16/2024 11.5  8.1 - 13.5 fL Final    NRBC Automated 12/16/2024 0.0  0.0 per 100 WBC Final    Neutrophils % 12/16/2024 38  36 - 65 % Final    Lymphocytes % 12/16/2024 48 (H)  24 - 43 % Final    Monocytes % 12/16/2024 10  3 - 12 % Final    Eosinophils % 12/16/2024 3  1 - 4 % Final    Basophils % 12/16/2024 1  0 - 2 % Final    Immature Granulocytes % 12/16/2024 0  0 % Final    Neutrophils Absolute 12/16/2024 2.72  1.50 - 8.10 k/uL Final    Lymphocytes Absolute 12/16/2024 3.38  1.10 - 3.70 k/uL Final    Monocytes Absolute 12/16/2024 0.72  0.10 - 1.20 k/uL Final    Eosinophils Absolute 12/16/2024

## 2024-12-19 PROBLEM — R73.03 PREDIABETES: Status: ACTIVE | Noted: 2024-12-19

## 2024-12-19 PROBLEM — K57.90 DIVERTICULOSIS: Status: ACTIVE | Noted: 2019-02-19

## 2024-12-19 PROBLEM — M62.08 DIASTASIS RECTI: Status: ACTIVE | Noted: 2024-12-19

## 2024-12-19 NOTE — ASSESSMENT & PLAN NOTE
- Initially diagnosed with diabetes mellitus 18 years ago, later reclassified as prediabetes, resulting in the discontinuation of antidiabetic medications.  - Reports no complications since the reclassification.

## 2024-12-19 NOTE — ASSESSMENT & PLAN NOTE
- The patient has been on dicyclomine therapy for 5 years.  - Reports bowel movements occurring twice daily. Consumes Metamucil nightly for fiber supplementation.  - will decrease Bentyl dose from 20 mg to 10 mg and continue to monitor bowel movements

## 2024-12-19 NOTE — ASSESSMENT & PLAN NOTE
- Status post-hernia repair, with an abdominal bulge observed when in the supine position.  - condition is not concerning as it does not cause pain  - exercises to engage the core muscles were provided.

## 2025-01-03 PROBLEM — Z87.891 FORMER CIGARETTE SMOKER: Status: ACTIVE | Noted: 2025-01-03

## 2025-01-03 PROBLEM — F41.1 GAD (GENERALIZED ANXIETY DISORDER): Status: ACTIVE | Noted: 2019-09-05

## 2025-01-04 NOTE — ASSESSMENT & PLAN NOTE
-No refills needed today  -Hypertension lab panel ordered today  -Follow-up in 4 weeks to discuss lab results

## 2025-01-04 NOTE — ASSESSMENT & PLAN NOTE
-Patient has been on trazodone for a while now, has issues falling asleep without it  -Previously on 100 mg dose, but patient would like to taper back down to 50 mg-prescription sent to pharmacy

## 2025-01-04 NOTE — ASSESSMENT & PLAN NOTE
-Discussed that unmanaged sleep apnea can worsen hypertension, put strain on heart  -Briefly discussed possible pulmonology/sleep medicine referral in the future

## 2025-01-09 DIAGNOSIS — K57.90 DIVERTICULOSIS: ICD-10-CM

## 2025-01-09 NOTE — TELEPHONE ENCOUNTER
Saravanan Schaefer is requesting a refill on the following medication(s):  Requested Prescriptions     Pending Prescriptions Disp Refills    dicyclomine (BENTYL) 10 MG capsule [Pharmacy Med Name: Dicyclomine HCl 10 MG Oral Capsule] 42 capsule 0     Sig: TAKE 1 CAPSULE BY MOUTH IN THE MORNING AND AT BEDTIME FOR 14 DAYS AND THEN 1 ONCE DAILY FOR 14 DAYS       Last Visit Date (If Applicable):  12/18/2024    Next Visit Date:    1/15/2025

## 2025-01-12 RX ORDER — DICYCLOMINE HYDROCHLORIDE 10 MG/1
10 CAPSULE ORAL NIGHTLY
Qty: 30 CAPSULE | Refills: 0 | Status: SHIPPED | OUTPATIENT
Start: 2025-01-12 | End: 2025-02-11

## 2025-01-15 ENCOUNTER — OFFICE VISIT (OUTPATIENT)
Dept: PRIMARY CARE CLINIC | Age: 75
End: 2025-01-15

## 2025-01-15 VITALS
OXYGEN SATURATION: 98 % | BODY MASS INDEX: 25.88 KG/M2 | DIASTOLIC BLOOD PRESSURE: 74 MMHG | SYSTOLIC BLOOD PRESSURE: 122 MMHG | HEIGHT: 66 IN | WEIGHT: 161 LBS | HEART RATE: 73 BPM

## 2025-01-15 DIAGNOSIS — E78.00 PURE HYPERCHOLESTEROLEMIA: ICD-10-CM

## 2025-01-15 DIAGNOSIS — K21.9 GASTROESOPHAGEAL REFLUX DISEASE WITHOUT ESOPHAGITIS: ICD-10-CM

## 2025-01-15 DIAGNOSIS — Z87.891 FORMER CIGARETTE SMOKER: ICD-10-CM

## 2025-01-15 DIAGNOSIS — R06.00 DYSPNEA, UNSPECIFIED TYPE: ICD-10-CM

## 2025-01-15 DIAGNOSIS — R05.8 DRY COUGH: ICD-10-CM

## 2025-01-15 DIAGNOSIS — I10 ESSENTIAL HYPERTENSION: Primary | ICD-10-CM

## 2025-01-15 DIAGNOSIS — Z79.899 MEDICATION MANAGEMENT: ICD-10-CM

## 2025-01-15 DIAGNOSIS — K57.90 DIVERTICULOSIS: ICD-10-CM

## 2025-01-15 RX ORDER — ROSUVASTATIN CALCIUM 10 MG/1
10 TABLET, COATED ORAL DAILY
Qty: 30 TABLET | Refills: 0 | Status: SHIPPED | OUTPATIENT
Start: 2025-01-15

## 2025-01-15 RX ORDER — DICYCLOMINE HYDROCHLORIDE 10 MG/1
10 CAPSULE ORAL 2 TIMES DAILY
Qty: 60 CAPSULE | Refills: 0 | Status: SHIPPED | OUTPATIENT
Start: 2025-01-15 | End: 2025-02-14

## 2025-01-15 SDOH — ECONOMIC STABILITY: FOOD INSECURITY: WITHIN THE PAST 12 MONTHS, THE FOOD YOU BOUGHT JUST DIDN'T LAST AND YOU DIDN'T HAVE MONEY TO GET MORE.: NEVER TRUE

## 2025-01-15 SDOH — ECONOMIC STABILITY: FOOD INSECURITY: WITHIN THE PAST 12 MONTHS, YOU WORRIED THAT YOUR FOOD WOULD RUN OUT BEFORE YOU GOT MONEY TO BUY MORE.: NEVER TRUE

## 2025-01-15 ASSESSMENT — PATIENT HEALTH QUESTIONNAIRE - PHQ9
SUM OF ALL RESPONSES TO PHQ QUESTIONS 1-9: 0
1. LITTLE INTEREST OR PLEASURE IN DOING THINGS: NOT AT ALL
SUM OF ALL RESPONSES TO PHQ QUESTIONS 1-9: 0
SUM OF ALL RESPONSES TO PHQ9 QUESTIONS 1 & 2: 0
2. FEELING DOWN, DEPRESSED OR HOPELESS: NOT AT ALL

## 2025-01-15 NOTE — PROGRESS NOTES
[facility]  [address] [place] [address].  [place] [address]  Phone: [phone]  Fax: [phone]    Office Visit Note    Date of Visit: 1/15/2025   Patient Name: Saravanan Schaefer   Patient :  1950     ASSESSMENT/PLAN   1. Essential hypertension  Overview:  Currently on Lisinopril, Amlodipine, managed by PCP.  Assessment & Plan:  - vitals wnl- continue current management  2. Dyspnea, unspecified type  Assessment & Plan:  - No pulmonary crackles or wheezes  - Pt thinks dypsnea is 2/2 deconditioning- pt would like to try regular exercise for 2 weeks  - If no improvement in 2-3 weeks, contact office for inhaler prescription (2 puffs every 4-6 hours as needed)  - Inform office if symptoms worsen within 2 weeks  3. Dry cough  Assessment & Plan:  - Dry cough primarily in the mornings  - Managed by coughing and blowing nose  - Occasional use of OTC allergy medication from [employer] helps  4. Gastroesophageal reflux disease without esophagitis  Overview:  Currently on Nexium, managed by PCP.  5. Diverticulosis  Assessment & Plan:  - Refill for dicyclomine 10 mg twice daily  Orders:  -     dicyclomine (BENTYL) 10 MG capsule; Take 1 capsule by mouth in the morning and at bedtime, Disp-60 capsule, R-0Normal  6. Pure hypercholesterolemia  Overview:  Currently on simvastatin, managed by PCP.  Assessment & Plan:  - Prescription for rosuvastatin 10 mg to replace simvastatin 40 mg  - If muscle aches occur with rosuvastatin, wait a week for resolution  - Inform office if aches persist or become intolerable  Orders:  -     rosuvastatin (CRESTOR) 10 MG tablet; Take 1 tablet by mouth daily, Disp-30 tablet, R-0Normal  7. Medication management  8. Former cigarette smoker  9. BMI 25.0-25.9,adult      There are no Patient Instructions on file for this visit.     Return in about 1 month (around 2/15/2025).    COMMUNICATION   Questions/concerns answered. Patient verbalized and expressed understanding. Medications, laboratory

## 2025-02-09 PROBLEM — R06.00 DYSPNEA: Status: ACTIVE | Noted: 2025-02-09

## 2025-02-09 PROBLEM — R05.8 DRY COUGH: Status: ACTIVE | Noted: 2025-02-09

## 2025-02-28 DIAGNOSIS — M79.642 PAIN IN BOTH HANDS: ICD-10-CM

## 2025-02-28 DIAGNOSIS — M79.641 PAIN IN BOTH HANDS: ICD-10-CM

## 2025-02-28 NOTE — TELEPHONE ENCOUNTER
Saravanan Schaefer is requesting a refill on the following medication(s):  Requested Prescriptions     Pending Prescriptions Disp Refills    meloxicam (MOBIC) 15 MG tablet [Pharmacy Med Name: Meloxicam 15 MG Oral Tablet] 90 tablet 0     Sig: Take 1 tablet by mouth once daily       Last Visit Date (If Applicable):  1/15/2025    Next Visit Date:    Visit date not found

## 2025-03-05 RX ORDER — MELOXICAM 15 MG/1
TABLET ORAL
Qty: 90 TABLET | Refills: 0 | Status: SHIPPED | OUTPATIENT
Start: 2025-03-05

## 2025-03-21 DIAGNOSIS — K57.90 DIVERTICULOSIS: ICD-10-CM

## 2025-03-24 RX ORDER — DICYCLOMINE HYDROCHLORIDE 10 MG/1
10 CAPSULE ORAL 2 TIMES DAILY
Qty: 60 CAPSULE | Refills: 0 | Status: SHIPPED | OUTPATIENT
Start: 2025-03-24 | End: 2025-04-23

## 2025-03-27 ENCOUNTER — OFFICE VISIT (OUTPATIENT)
Dept: PRIMARY CARE CLINIC | Age: 75
End: 2025-03-27
Payer: MEDICARE

## 2025-03-27 VITALS
DIASTOLIC BLOOD PRESSURE: 70 MMHG | OXYGEN SATURATION: 98 % | WEIGHT: 163 LBS | HEART RATE: 88 BPM | HEIGHT: 66 IN | SYSTOLIC BLOOD PRESSURE: 132 MMHG | BODY MASS INDEX: 26.2 KG/M2

## 2025-03-27 DIAGNOSIS — E66.3 OVERWEIGHT (BMI 25.0-29.9): ICD-10-CM

## 2025-03-27 DIAGNOSIS — F41.1 GAD (GENERALIZED ANXIETY DISORDER): ICD-10-CM

## 2025-03-27 DIAGNOSIS — K57.90 DIVERTICULOSIS: ICD-10-CM

## 2025-03-27 DIAGNOSIS — E78.00 PURE HYPERCHOLESTEROLEMIA: ICD-10-CM

## 2025-03-27 DIAGNOSIS — F51.01 PRIMARY INSOMNIA: ICD-10-CM

## 2025-03-27 DIAGNOSIS — M47.16 SPONDYLOSIS OF LUMBAR SPINE WITH MYELOPATHY: Primary | ICD-10-CM

## 2025-03-27 DIAGNOSIS — I10 ESSENTIAL HYPERTENSION: ICD-10-CM

## 2025-03-27 DIAGNOSIS — Z87.891 FORMER CIGARETTE SMOKER: ICD-10-CM

## 2025-03-27 DIAGNOSIS — Z85.828 HISTORY OF SKIN CANCER: ICD-10-CM

## 2025-03-27 PROCEDURE — 1123F ACP DISCUSS/DSCN MKR DOCD: CPT | Performed by: STUDENT IN AN ORGANIZED HEALTH CARE EDUCATION/TRAINING PROGRAM

## 2025-03-27 PROCEDURE — G8427 DOCREV CUR MEDS BY ELIG CLIN: HCPCS | Performed by: STUDENT IN AN ORGANIZED HEALTH CARE EDUCATION/TRAINING PROGRAM

## 2025-03-27 PROCEDURE — G8417 CALC BMI ABV UP PARAM F/U: HCPCS | Performed by: STUDENT IN AN ORGANIZED HEALTH CARE EDUCATION/TRAINING PROGRAM

## 2025-03-27 PROCEDURE — 3075F SYST BP GE 130 - 139MM HG: CPT | Performed by: STUDENT IN AN ORGANIZED HEALTH CARE EDUCATION/TRAINING PROGRAM

## 2025-03-27 PROCEDURE — 1036F TOBACCO NON-USER: CPT | Performed by: STUDENT IN AN ORGANIZED HEALTH CARE EDUCATION/TRAINING PROGRAM

## 2025-03-27 PROCEDURE — 3078F DIAST BP <80 MM HG: CPT | Performed by: STUDENT IN AN ORGANIZED HEALTH CARE EDUCATION/TRAINING PROGRAM

## 2025-03-27 PROCEDURE — 99214 OFFICE O/P EST MOD 30 MIN: CPT | Performed by: STUDENT IN AN ORGANIZED HEALTH CARE EDUCATION/TRAINING PROGRAM

## 2025-03-27 PROCEDURE — 3017F COLORECTAL CA SCREEN DOC REV: CPT | Performed by: STUDENT IN AN ORGANIZED HEALTH CARE EDUCATION/TRAINING PROGRAM

## 2025-03-27 RX ORDER — MUPIROCIN 20 MG/G
OINTMENT TOPICAL 3 TIMES DAILY
COMMUNITY
End: 2025-03-27 | Stop reason: SDUPTHER

## 2025-03-27 RX ORDER — ROSUVASTATIN CALCIUM 10 MG/1
10 TABLET, COATED ORAL DAILY
Qty: 90 TABLET | Refills: 0 | Status: SHIPPED | OUTPATIENT
Start: 2025-03-27

## 2025-03-27 RX ORDER — CITALOPRAM HYDROBROMIDE 20 MG/1
TABLET ORAL
Qty: 90 TABLET | Refills: 1 | Status: SHIPPED | OUTPATIENT
Start: 2025-03-27

## 2025-03-27 RX ORDER — MUPIROCIN 20 MG/G
OINTMENT TOPICAL 3 TIMES DAILY
Qty: 22 G | Refills: 0 | Status: SHIPPED | OUTPATIENT
Start: 2025-03-27

## 2025-03-27 NOTE — ASSESSMENT & PLAN NOTE
- Takes dicyclomine 10 mg once daily in the evening- down from bid dosing and Metamucil nightly  - Continue medications and ensure adequate hydration

## 2025-03-27 NOTE — PATIENT INSTRUCTIONS
I have generated a referral for pain management.  If you have not heard from them in 4 business days, you can call the number on the order sheet and schedule an appointment to get established. If you have any issues getting scheduled, please call the office to let me know.

## 2025-03-27 NOTE — PROGRESS NOTES
[facility]  [address] [place] [address].  [place] [address]  Phone: [phone]  Fax: [phone]    Office Visit Note    Date of Visit: 3/27/2025   Patient Name: Saravanan Schaefer   Patient :  1950     ASSESSMENT/PLAN     1. Spondylosis of lumbar spine with myelopathy  Assessment & Plan:  - Worsening back pain, minimal relief from previous physical therapy  - Pain exacerbated by bending over or working over a counter, with muscle tightness and sciatica down the calf  - Ordered lumbar spine x-ray due to absence of recent imaging  - Referral to pain management for further evaluation and potential local pain control measures  - Continue meloxicam and ibuprofen as needed until seen by pain management  Orders:  -     XR LUMBAR SPINE (2-3 VIEWS); Future  -     Debbie Ac MD, Pain Management, [facility]  2. Essential hypertension  Overview:  Currently on Lisinopril, Amlodipine, managed by PCP.  3. Pure hypercholesterolemia  Overview:  Currently on rosuvastatin, managed by PCP.  Assessment & Plan:  - stable. Continue current treatment plan  Orders:  -     rosuvastatin (CRESTOR) 10 MG tablet; Take 1 tablet by mouth daily, Disp-90 tablet, R-0Normal  4. Primary insomnia  Overview:  Currently on trazodone, managed by PCP.  Assessment & Plan:  - Takes trazodone 50 mg for sleep  - Try reducing dose to 25 mg by cutting the tablet in half to see if it still provides adequate sleep. If not, return to 50 mg dose  5. Diverticulosis  Assessment & Plan:  - Takes dicyclomine 10 mg once daily in the evening- down from bid dosing and Metamucil nightly  - Continue medications and ensure adequate hydration  6. ARUNA (generalized anxiety disorder)  Overview:  Currently on Celexa, managed by PCP.  Assessment & Plan:  - stable. Continue current treatment plan  Orders:  -     citalopram (CELEXA) 20 MG tablet; take 1 tablet by mouth once daily, Disp-90 tablet, R-1Normal  7. History of skin cancer  Assessment & Plan:  -

## 2025-03-31 PROBLEM — Z85.828 HISTORY OF SKIN CANCER: Status: ACTIVE | Noted: 2025-03-31

## 2025-03-31 NOTE — ASSESSMENT & PLAN NOTE
- Takes trazodone 50 mg for sleep  - Try reducing dose to 25 mg by cutting the tablet in half to see if it still provides adequate sleep. If not, return to 50 mg dose

## 2025-03-31 NOTE — ASSESSMENT & PLAN NOTE
- History of skin cancer with previous lesions removed from face and back  - No recent dermatology visits  - Referral to [facility] for annual skin check to monitor for new or concerning lesions

## 2025-03-31 NOTE — ASSESSMENT & PLAN NOTE
- Worsening back pain, minimal relief from previous physical therapy  - Pain exacerbated by bending over or working over a counter, with muscle tightness and sciatica down the calf  - Ordered lumbar spine x-ray due to absence of recent imaging  - Referral to pain management for further evaluation and potential local pain control measures  - Continue meloxicam and ibuprofen as needed until seen by pain management

## 2025-04-03 ENCOUNTER — TELEPHONE (OUTPATIENT)
Dept: PRIMARY CARE CLINIC | Age: 75
End: 2025-04-03

## 2025-04-04 ENCOUNTER — RESULTS FOLLOW-UP (OUTPATIENT)
Dept: PRIMARY CARE CLINIC | Age: 75
End: 2025-04-04

## 2025-04-16 ENCOUNTER — INITIAL CONSULT (OUTPATIENT)
Dept: PAIN MANAGEMENT | Age: 75
End: 2025-04-16
Payer: MEDICARE

## 2025-04-16 VITALS — HEIGHT: 66 IN | BODY MASS INDEX: 26.2 KG/M2 | WEIGHT: 163 LBS

## 2025-04-16 DIAGNOSIS — M47.817 LUMBOSACRAL SPONDYLOSIS WITHOUT MYELOPATHY: Primary | ICD-10-CM

## 2025-04-16 PROCEDURE — 1036F TOBACCO NON-USER: CPT | Performed by: PAIN MEDICINE

## 2025-04-16 PROCEDURE — 1159F MED LIST DOCD IN RCRD: CPT | Performed by: PAIN MEDICINE

## 2025-04-16 PROCEDURE — G8427 DOCREV CUR MEDS BY ELIG CLIN: HCPCS | Performed by: PAIN MEDICINE

## 2025-04-16 PROCEDURE — 99204 OFFICE O/P NEW MOD 45 MIN: CPT | Performed by: PAIN MEDICINE

## 2025-04-16 PROCEDURE — G8417 CALC BMI ABV UP PARAM F/U: HCPCS | Performed by: PAIN MEDICINE

## 2025-04-16 PROCEDURE — 1123F ACP DISCUSS/DSCN MKR DOCD: CPT | Performed by: PAIN MEDICINE

## 2025-04-16 PROCEDURE — 3017F COLORECTAL CA SCREEN DOC REV: CPT | Performed by: PAIN MEDICINE

## 2025-04-16 ASSESSMENT — ENCOUNTER SYMPTOMS: BACK PAIN: 1

## 2025-04-16 NOTE — PROGRESS NOTES
HPI:     Back Pain  This is a chronic problem. The current episode started more than 1 year ago. The problem occurs constantly. The problem is unchanged. The pain is present in the lumbar spine. The quality of the pain is described as aching. The pain does not radiate. The pain is moderate. The pain is Worse during the day. The symptoms are aggravated by standing and bending (activity based). Stiffness is present All day. Risk factors include history of cancer (skin). He has tried heat, muscle relaxant, NSAIDs and home exercises for the symptoms.     Chronic low back pain.  X-ray with multilevel degenerative changes.  Reports has done physical therapy with no benefit.  Reports history of back surgery x 2.  MRI from 2016 with multilevel degenerative changes and varying levels of stenosis.    Pain ranges from a 0/10 to a 7/10 depending on activity.    Patient denies any new neurological symptoms. No bowel or bladder incontinence, no weakness, and no falling.    Review of OARRS does not show any aberrant prescription behavior.     Past Medical History:   Diagnosis Date    Acid indigestion     Cancer (HCC)     skin    Chronic back pain     Hyperlipidemia     Hypertension     NATALY (obstructive sleep apnea)     does not tolerate cpap- uses nasal strip    Osteoarthritis     Retention of urine        Past Surgical History:   Procedure Laterality Date    BACK SURGERY  2014    S5 pinch nerve laser correction    CERVICAL SPINE SURGERY      small plate    COLONOSCOPY      HAND SURGERY Right     HAND TENDON SURGERY Left     HERNIA REPAIR      SKIN BIOPSY      WRIST SURGERY Right 2/8/2024    RIGHT WRIST PI ENTERECTOMY PROXIMAL ROW CARPECTOMY WITH CAPITATE RESURFACING WITH ARTHROSURFACE BORA-CAPITATE performed by Shan Salter MD at [facility] OR       No Known Allergies      Current Outpatient Medications:     citalopram (CELEXA) 20 MG tablet, take 1 tablet by mouth once daily, Disp: 90 tablet, Rfl: 1    rosuvastatin

## 2025-04-19 DIAGNOSIS — K57.90 DIVERTICULOSIS: ICD-10-CM

## 2025-04-21 RX ORDER — DICYCLOMINE HYDROCHLORIDE 10 MG/1
CAPSULE ORAL
Qty: 60 CAPSULE | Refills: 1 | Status: SHIPPED | OUTPATIENT
Start: 2025-04-21

## 2025-04-21 NOTE — TELEPHONE ENCOUNTER
Saravanan Schaefer is requesting a refill on the following medication(s):  Requested Prescriptions     Pending Prescriptions Disp Refills    dicyclomine (BENTYL) 10 MG capsule [Pharmacy Med Name: Dicyclomine HCl 10 MG Oral Capsule] 60 capsule 0     Sig: TAKE 1 CAPSULE BY MOUTH IN THE MORNING AND 1 AT BEDTIME       Last Visit Date (If Applicable):  3/27/2025    Next Visit Date:    5/8/2025

## 2025-04-30 ENCOUNTER — HOSPITAL ENCOUNTER (OUTPATIENT)
Dept: MRI IMAGING | Facility: CLINIC | Age: 75
Discharge: HOME | End: 2025-05-02
Attending: PAIN MEDICINE
Payer: MEDICARE

## 2025-04-30 DIAGNOSIS — I10 ESSENTIAL HYPERTENSION: ICD-10-CM

## 2025-04-30 DIAGNOSIS — M47.817 LUMBOSACRAL SPONDYLOSIS WITHOUT MYELOPATHY: ICD-10-CM

## 2025-04-30 PROCEDURE — 6360000004 HC RX CONTRAST MEDICATION: Performed by: PAIN MEDICINE

## 2025-04-30 PROCEDURE — A9579 GAD-BASE MR CONTRAST NOS,1ML: HCPCS | Performed by: PAIN MEDICINE

## 2025-04-30 PROCEDURE — 72158 MRI LUMBAR SPINE W/O & W/DYE: CPT

## 2025-04-30 RX ORDER — LISINOPRIL 20 MG/1
TABLET ORAL
Qty: 90 TABLET | Refills: 1 | Status: CANCELLED | OUTPATIENT
Start: 2025-04-30

## 2025-04-30 RX ORDER — AMLODIPINE BESYLATE 10 MG/1
TABLET ORAL
Qty: 90 TABLET | Refills: 1 | Status: CANCELLED | OUTPATIENT
Start: 2025-04-30

## 2025-04-30 RX ADMIN — GADOTERIDOL 15 ML: 279.3 INJECTION, SOLUTION INTRAVENOUS at 11:06

## 2025-04-30 NOTE — TELEPHONE ENCOUNTER
LAST VISIT:   3/27/2025     Future Appointments   Date Time Provider Department Center   5/8/2025 11:30 AM Meryl Dean MD [facility]   5/14/2025  3:00 PM Debbie Johnson MD [facility]

## 2025-05-06 DIAGNOSIS — I10 ESSENTIAL HYPERTENSION: ICD-10-CM

## 2025-05-06 RX ORDER — AMLODIPINE BESYLATE 10 MG/1
TABLET ORAL
Qty: 90 TABLET | Refills: 1 | Status: SHIPPED | OUTPATIENT
Start: 2025-05-06

## 2025-05-06 RX ORDER — LISINOPRIL 20 MG/1
TABLET ORAL
Qty: 90 TABLET | Refills: 1 | Status: SHIPPED | OUTPATIENT
Start: 2025-05-06

## 2025-05-06 NOTE — TELEPHONE ENCOUNTER
LAST VISIT:   3/27/2025     Future Appointments   Date Time Provider Department Center   5/14/2025  3:00 PM Debbie Johnson MD [facility]     Patient is out of medication for 4 days and has called multi times for refill

## 2025-05-09 LAB — CREAT BLD-MCNC: 0.7 MG/DL (ref 0.6–1.4)

## 2025-05-14 ENCOUNTER — OFFICE VISIT (OUTPATIENT)
Dept: PAIN MANAGEMENT | Age: 75
End: 2025-05-14
Payer: MEDICARE

## 2025-05-14 VITALS — BODY MASS INDEX: 24.91 KG/M2 | HEIGHT: 66 IN | WEIGHT: 155 LBS

## 2025-05-14 DIAGNOSIS — M47.817 LUMBOSACRAL SPONDYLOSIS WITHOUT MYELOPATHY: Primary | ICD-10-CM

## 2025-05-14 DIAGNOSIS — M51.360 DEGENERATION OF INTERVERTEBRAL DISC OF LUMBAR REGION WITH DISCOGENIC BACK PAIN: ICD-10-CM

## 2025-05-14 PROCEDURE — 1036F TOBACCO NON-USER: CPT | Performed by: PAIN MEDICINE

## 2025-05-14 PROCEDURE — 1159F MED LIST DOCD IN RCRD: CPT | Performed by: PAIN MEDICINE

## 2025-05-14 PROCEDURE — G8427 DOCREV CUR MEDS BY ELIG CLIN: HCPCS | Performed by: PAIN MEDICINE

## 2025-05-14 PROCEDURE — 99214 OFFICE O/P EST MOD 30 MIN: CPT | Performed by: PAIN MEDICINE

## 2025-05-14 PROCEDURE — 3017F COLORECTAL CA SCREEN DOC REV: CPT | Performed by: PAIN MEDICINE

## 2025-05-14 PROCEDURE — G8417 CALC BMI ABV UP PARAM F/U: HCPCS | Performed by: PAIN MEDICINE

## 2025-05-14 PROCEDURE — 1123F ACP DISCUSS/DSCN MKR DOCD: CPT | Performed by: PAIN MEDICINE

## 2025-05-14 ASSESSMENT — ENCOUNTER SYMPTOMS: BACK PAIN: 1

## 2025-05-14 NOTE — PROGRESS NOTES
HPI:     Back Pain  This is a chronic problem. The current episode started more than 1 year ago. The problem occurs intermittently. The problem is unchanged. The pain is present in the lumbar spine. The quality of the pain is described as aching. The pain does not radiate. The pain is Worse during the day. The symptoms are aggravated by bending, position, twisting, standing, sitting, lying down and coughing. Stiffness is present All day. Risk factors include history of cancer. He has tried home exercises, bed rest, walking, chiropractic manipulation, ice, heat, muscle relaxant, analgesics and NSAIDs for the symptoms.     Nonradiating low back pain the worst of his complaints.  Has done physical therapy.  Continues to have lingering pain.    Pain ranges from a 0/10 to a 7/10 depending on activity.    Patient denies any new neurological symptoms. No bowel or bladder incontinence, no weakness, and no falling.    Review of [place] does not show any aberrant prescription behavior.     Past Medical History:   Diagnosis Date    Acid indigestion     Cancer (HCC)     skin    Chronic back pain     Hyperlipidemia     Hypertension     NATALY (obstructive sleep apnea)     does not tolerate cpap- uses nasal strip    Osteoarthritis     Retention of urine        Past Surgical History:   Procedure Laterality Date    BACK SURGERY  2014    S5 pinch nerve laser correction    CERVICAL SPINE SURGERY      small plate    COLONOSCOPY      HAND SURGERY Right     HAND TENDON SURGERY Left     HERNIA REPAIR      SKIN BIOPSY      WRIST SURGERY Right 2/8/2024    RIGHT WRIST PI ENTERECTOMY PROXIMAL ROW CARPECTOMY WITH CAPITATE RESURFACING WITH ARTHROSURFACE BORA-CAPITATE performed by Shan Salter MD at [facility] OR       No Known Allergies      Current Outpatient Medications:     lisinopril (PRINIVIL;ZESTRIL) 20 MG tablet, take 1 tablet by mouth once daily, Disp: 90 tablet, Rfl: 1    amLODIPine (NORVASC) 10 MG tablet, take 1 tablet by mouth

## 2025-05-19 ENCOUNTER — TELEPHONE (OUTPATIENT)
Dept: PAIN MANAGEMENT | Age: 75
End: 2025-05-19

## 2025-05-19 NOTE — TELEPHONE ENCOUNTER
Patient called to see if he would be able to have a sooner appointment.  Scheduled 6/9/25 and visit is currently \"pending review\"  patient also asked if there was a cancellation list that he could be put on.  Writer informed patient that an encounter would be put in for the phone call.

## 2025-06-09 ENCOUNTER — HOSPITAL ENCOUNTER (OUTPATIENT)
Dept: PAIN MANAGEMENT | Facility: CLINIC | Age: 75
Discharge: HOME | End: 2025-06-09
Payer: MEDICARE

## 2025-06-09 VITALS
BODY MASS INDEX: 25.39 KG/M2 | HEIGHT: 66 IN | WEIGHT: 158 LBS | SYSTOLIC BLOOD PRESSURE: 138 MMHG | DIASTOLIC BLOOD PRESSURE: 75 MMHG | OXYGEN SATURATION: 97 % | RESPIRATION RATE: 12 BRPM | HEART RATE: 93 BPM | TEMPERATURE: 98.3 F

## 2025-06-09 DIAGNOSIS — R52 PAIN MANAGEMENT: ICD-10-CM

## 2025-06-09 DIAGNOSIS — M47.817 LUMBOSACRAL SPONDYLOSIS WITHOUT MYELOPATHY: ICD-10-CM

## 2025-06-09 PROCEDURE — 64494 INJ PARAVERT F JNT L/S 2 LEV: CPT

## 2025-06-09 PROCEDURE — 6360000002 HC RX W HCPCS: Performed by: PAIN MEDICINE

## 2025-06-09 PROCEDURE — 64493 INJ PARAVERT F JNT L/S 1 LEV: CPT

## 2025-06-09 PROCEDURE — 64493 INJ PARAVERT F JNT L/S 1 LEV: CPT | Performed by: PAIN MEDICINE

## 2025-06-09 PROCEDURE — 64494 INJ PARAVERT F JNT L/S 2 LEV: CPT | Performed by: PAIN MEDICINE

## 2025-06-09 RX ORDER — BUPIVACAINE HYDROCHLORIDE 2.5 MG/ML
INJECTION, SOLUTION EPIDURAL; INFILTRATION; INTRACAUDAL; PERINEURAL
Status: COMPLETED | OUTPATIENT
Start: 2025-06-09 | End: 2025-06-09

## 2025-06-09 RX ORDER — LIDOCAINE HYDROCHLORIDE 5 MG/ML
INJECTION, SOLUTION INFILTRATION; INTRAVENOUS
Status: COMPLETED | OUTPATIENT
Start: 2025-06-09 | End: 2025-06-09

## 2025-06-09 RX ORDER — MIDAZOLAM HYDROCHLORIDE 2 MG/2ML
INJECTION, SOLUTION INTRAMUSCULAR; INTRAVENOUS
Status: COMPLETED | OUTPATIENT
Start: 2025-06-09 | End: 2025-06-09

## 2025-06-09 RX ADMIN — MIDAZOLAM HYDROCHLORIDE 1 MG: 1 INJECTION, SOLUTION INTRAMUSCULAR; INTRAVENOUS at 13:18

## 2025-06-09 RX ADMIN — BUPIVACAINE HYDROCHLORIDE 10 ML: 2.5 INJECTION, SOLUTION EPIDURAL; INFILTRATION; INTRACAUDAL; PERINEURAL at 13:32

## 2025-06-09 RX ADMIN — MIDAZOLAM HYDROCHLORIDE 1 MG: 1 INJECTION, SOLUTION INTRAMUSCULAR; INTRAVENOUS at 13:26

## 2025-06-09 RX ADMIN — LIDOCAINE HYDROCHLORIDE 6 ML: 5 INJECTION, SOLUTION INFILTRATION; INTRAVENOUS at 13:25

## 2025-06-09 ASSESSMENT — PAIN - FUNCTIONAL ASSESSMENT: PAIN_FUNCTIONAL_ASSESSMENT: 0-10

## 2025-06-09 ASSESSMENT — PAIN DESCRIPTION - DESCRIPTORS: DESCRIPTORS: STABBING;TIGHTNESS;THROBBING

## 2025-06-09 NOTE — DISCHARGE INSTRUCTIONS
You have received a sedative/anesthetic therefore you should not consume any alcoholic beverages for 24 hours.  Do not drive or operate machinery for 24 hours.   Do not take a tub bath for 72 hours after procedure (this includes hot tubs).  You may shower, but avoid hot water to injection site.   Avoid strenuous activity TODAY especially if you experience dizziness.   Remove band-aid the next day.    Wash off any residual iodine 24 hours from today.   Do not use heat, heating pad, or any other heating device over the injection site for 3 days after the procedure.    If you experience pain after your procedure, you may continue with your current pain medication as prescribed.  (DO NOT INCREASE YOUR PAIN MEDICATION WITHOUT TALKING TO DOCTOR)  Soreness and pain at injection site is common, may use ice to reduce soreness.    Please complete pain diary as instructed. The office staff will call you to discuss the results of the procedure within 24 hours, please call the office if you do not hear from them.      Call [facility] at [phone] if you experience:   Fever, chills or temperature over 100    Vomiting, headache, persistent stiff neck, nausea or blurred vision   Difficulty urinating or unable to urinate within 8 hours   Increase in weakness, numbness or loss of function of limbs  Increased redness, swelling or drainage at the injection site

## 2025-06-09 NOTE — H&P
Pain Pre-Op H&P Note    Debbie Johnson MD    HPI: Saravanan Schaefer  presents with back pain.  Wishes to proceed with diagnostic medial branch block.    Past Medical History:   Diagnosis Date    Acid indigestion     Cancer (HCC)     skin    Chronic back pain     Hyperlipidemia     Hypertension     NATALY (obstructive sleep apnea)     does not tolerate cpap- uses nasal strip    Osteoarthritis     Retention of urine        Past Surgical History:   Procedure Laterality Date    BACK SURGERY  2014    S5 pinch nerve laser correction    CERVICAL SPINE SURGERY      small plate    COLONOSCOPY      HAND SURGERY Right     HAND TENDON SURGERY Left     HERNIA REPAIR      SKIN BIOPSY      WRIST SURGERY Right 2/8/2024    RIGHT WRIST PI ENTERECTOMY PROXIMAL ROW CARPECTOMY WITH CAPITATE RESURFACING WITH ARTHROSURFACE BORA-CAPITATE performed by Shan Salter MD at [facility] OR       Family History   Problem Relation Age of Onset    COPD Mother     COPD Sister     COPD Brother        No Known Allergies      Current Outpatient Medications:     lisinopril (PRINIVIL;ZESTRIL) 20 MG tablet, take 1 tablet by mouth once daily, Disp: 90 tablet, Rfl: 1    amLODIPine (NORVASC) 10 MG tablet, take 1 tablet by mouth once daily, Disp: 90 tablet, Rfl: 1    dicyclomine (BENTYL) 10 MG capsule, TAKE 1 CAPSULE BY MOUTH IN THE MORNING AND 1 AT BEDTIME, Disp: 60 capsule, Rfl: 1    citalopram (CELEXA) 20 MG tablet, take 1 tablet by mouth once daily, Disp: 90 tablet, Rfl: 1    rosuvastatin (CRESTOR) 10 MG tablet, Take 1 tablet by mouth daily, Disp: 90 tablet, Rfl: 0    mupirocin (BACTROBAN) 2 % ointment, Apply topically 3 times daily Apply topically 3 times daily., Disp: 22 g, Rfl: 0    meloxicam (MOBIC) 15 MG tablet, Take 1 tablet by mouth once daily, Disp: 90 tablet, Rfl: 0    traZODone (DESYREL) 50 MG tablet, Take 1 tablet by mouth nightly, Disp: 90 tablet, Rfl: 0    ibuprofen (ADVIL;MOTRIN) 800 MG tablet, Take 1 tablet by mouth every 6 hours as

## 2025-06-10 ENCOUNTER — TELEPHONE (OUTPATIENT)
Dept: PAIN MANAGEMENT | Age: 75
End: 2025-06-10

## 2025-06-10 NOTE — TELEPHONE ENCOUNTER
S/P:MARCIN MBB L4/5, L5/S1  Diagnostic       DOS: 06/09/2025    Pain  before procedure with activity: 7    Pain after procedure with activity: 0-2    Activities following procedure include: went for a walk twice.     % of pain relief: 80    Procedure successful: Yes    OR scheduled: 7/7/25

## 2025-06-17 DIAGNOSIS — F51.01 PRIMARY INSOMNIA: ICD-10-CM

## 2025-06-20 RX ORDER — TRAZODONE HYDROCHLORIDE 50 MG/1
50 TABLET ORAL NIGHTLY
Qty: 30 TABLET | Refills: 0 | Status: SHIPPED | OUTPATIENT
Start: 2025-06-20 | End: 2025-07-20

## 2025-06-27 ENCOUNTER — OFFICE VISIT (OUTPATIENT)
Dept: PRIMARY CARE CLINIC | Age: 75
End: 2025-06-27
Payer: MEDICARE

## 2025-06-27 VITALS
DIASTOLIC BLOOD PRESSURE: 86 MMHG | HEART RATE: 68 BPM | BODY MASS INDEX: 25.88 KG/M2 | HEIGHT: 66 IN | OXYGEN SATURATION: 97 % | WEIGHT: 161 LBS | SYSTOLIC BLOOD PRESSURE: 138 MMHG

## 2025-06-27 DIAGNOSIS — M47.16 SPONDYLOSIS OF LUMBAR SPINE WITH MYELOPATHY: ICD-10-CM

## 2025-06-27 DIAGNOSIS — E78.00 PURE HYPERCHOLESTEROLEMIA: ICD-10-CM

## 2025-06-27 DIAGNOSIS — I10 ESSENTIAL HYPERTENSION: ICD-10-CM

## 2025-06-27 DIAGNOSIS — R53.83 FATIGUE, UNSPECIFIED TYPE: ICD-10-CM

## 2025-06-27 DIAGNOSIS — R73.03 PREDIABETES: ICD-10-CM

## 2025-06-27 DIAGNOSIS — E87.0 HYPERNATREMIA: ICD-10-CM

## 2025-06-27 DIAGNOSIS — Z00.00 MEDICARE ANNUAL WELLNESS VISIT, SUBSEQUENT: Primary | ICD-10-CM

## 2025-06-27 DIAGNOSIS — E66.3 OVERWEIGHT (BMI 25.0-29.9): ICD-10-CM

## 2025-06-27 DIAGNOSIS — K21.9 GASTROESOPHAGEAL REFLUX DISEASE WITHOUT ESOPHAGITIS: ICD-10-CM

## 2025-06-27 DIAGNOSIS — Z87.891 FORMER CIGARETTE SMOKER: ICD-10-CM

## 2025-06-27 DIAGNOSIS — M79.641 PAIN IN BOTH HANDS: ICD-10-CM

## 2025-06-27 DIAGNOSIS — M79.642 PAIN IN BOTH HANDS: ICD-10-CM

## 2025-06-27 PROCEDURE — G8417 CALC BMI ABV UP PARAM F/U: HCPCS | Performed by: STUDENT IN AN ORGANIZED HEALTH CARE EDUCATION/TRAINING PROGRAM

## 2025-06-27 PROCEDURE — 3079F DIAST BP 80-89 MM HG: CPT | Performed by: STUDENT IN AN ORGANIZED HEALTH CARE EDUCATION/TRAINING PROGRAM

## 2025-06-27 PROCEDURE — 1036F TOBACCO NON-USER: CPT | Performed by: STUDENT IN AN ORGANIZED HEALTH CARE EDUCATION/TRAINING PROGRAM

## 2025-06-27 PROCEDURE — 3075F SYST BP GE 130 - 139MM HG: CPT | Performed by: STUDENT IN AN ORGANIZED HEALTH CARE EDUCATION/TRAINING PROGRAM

## 2025-06-27 PROCEDURE — 3017F COLORECTAL CA SCREEN DOC REV: CPT | Performed by: STUDENT IN AN ORGANIZED HEALTH CARE EDUCATION/TRAINING PROGRAM

## 2025-06-27 PROCEDURE — 99214 OFFICE O/P EST MOD 30 MIN: CPT | Performed by: STUDENT IN AN ORGANIZED HEALTH CARE EDUCATION/TRAINING PROGRAM

## 2025-06-27 PROCEDURE — 1123F ACP DISCUSS/DSCN MKR DOCD: CPT | Performed by: STUDENT IN AN ORGANIZED HEALTH CARE EDUCATION/TRAINING PROGRAM

## 2025-06-27 PROCEDURE — G0439 PPPS, SUBSEQ VISIT: HCPCS | Performed by: STUDENT IN AN ORGANIZED HEALTH CARE EDUCATION/TRAINING PROGRAM

## 2025-06-27 PROCEDURE — G8427 DOCREV CUR MEDS BY ELIG CLIN: HCPCS | Performed by: STUDENT IN AN ORGANIZED HEALTH CARE EDUCATION/TRAINING PROGRAM

## 2025-06-27 RX ORDER — MELOXICAM 15 MG/1
TABLET ORAL
Qty: 90 TABLET | Refills: 0 | Status: CANCELLED | OUTPATIENT
Start: 2025-06-27

## 2025-06-27 ASSESSMENT — PATIENT HEALTH QUESTIONNAIRE - PHQ9
SUM OF ALL RESPONSES TO PHQ QUESTIONS 1-9: 0
1. LITTLE INTEREST OR PLEASURE IN DOING THINGS: NOT AT ALL
2. FEELING DOWN, DEPRESSED OR HOPELESS: NOT AT ALL
SUM OF ALL RESPONSES TO PHQ QUESTIONS 1-9: 0

## 2025-06-27 ASSESSMENT — LIFESTYLE VARIABLES
HOW OFTEN DO YOU HAVE A DRINK CONTAINING ALCOHOL: 2-3 TIMES A WEEK
HOW MANY STANDARD DRINKS CONTAINING ALCOHOL DO YOU HAVE ON A TYPICAL DAY: 1 OR 2

## 2025-06-27 NOTE — PROGRESS NOTES
[facility]  [address] [place] [address].  [place] [address]  Phone: [phone]  Fax: [phone]    Medicare Annual Wellness Visit    Saravanan Schaefer is here for Medicare AWV (Patient states he's been feeling tired ongoing a few days, less motivated) and Other (WILBERT used at last visit)    Medicare annual wellness visit, subsequent  Comments:  - rec'd RSV and shingles vaccines  - plan for hearing screen after back pain mgmt  - colon cancer screening rec'd  Spondylosis of lumbar spine with myelopathy  Assessment & Plan:  - Significant back pain limits walking and activities like fishing and hunting.  - Severe muscle tightness in back after minimal activity on physical exam.  - Receiving test shots from pain management, next set scheduled for 07/07/2025.  - this chronic condition is managed by specialist. No findings today merit changing the current treatment plan.  Fatigue, unspecified type  Comments:  - Fatigue may be related to back pain and inability to engage in enjoyable activities.  - Possible link between fatigue and low mood due to chronic pain.  Orders:  -     TSH reflex to FT4; Future  -     Vitamin B12 & Folate; Future  Pain in both hands  Pure hypercholesterolemia  Assessment & Plan:  - lipid panel ordered today- continue current treatment plan unless labs indicate change is necessary  Orders:  -     Lipid Panel; Future  Gastroesophageal reflux disease without esophagitis  Assessment & Plan:  - CBC, CMP ordered for monitoring  Orders:  -     CBC; Future  -     Comprehensive Metabolic Panel; Future  Hypernatremia  -     Comprehensive Metabolic Panel; Future  Prediabetes  Assessment & Plan:  - a1c ordered today- continue current treatment plan unless labs indicate change is necessary  Orders:  -     Hemoglobin A1C; Future  Essential hypertension  Assessment & Plan:  - stable. Continue current treatment plan  - discussed that NSAID use in HTN is not ideal, but pt reports difficulty with normal

## 2025-06-27 NOTE — PATIENT INSTRUCTIONS
DEXA every 2 years for females N/A N/A   Screening for abdominal aortic aneurysm Males: Smoked >=100 cigarettes   Anyone:1st degree relative with abdominal aortic aneurysm.  One time screening between 65-76yo. 5/14/18 Completed   Low Dose CT/Lung Cancer  Between 50-80: Annually for anyone with 20pkyr history AND are currently smoking or have quit within the past 15yrs. N/A N/A   Glaucoma screening       Covered yearly for those:  with diabetes mellitus  with a family history of glaucoma  -Americans aged 50 and older  -Americans aged 65 and older January 2025 January 2026

## 2025-06-27 NOTE — PROGRESS NOTES
Medicare Annual Wellness Visit    Saravanan Schaefer is here for No chief complaint on file.    Assessment & Plan    Medicare annual wellness visit, subsequent    Results       Patient Instructions   The lab here in the office is open on:  Mon-Thurs  07:30-3:45p (closed from 12:30-1p for lunch)  Friday   8-12p    The office and lab are closed on weekends.      No follow-ups on file.    .pt     Subjective   {OPTIONAL - WILL AUTO-DELETE IF NOT USED:[id number]}  History of Present Illness      Patient's complete Health Risk Assessment and screening values have been reviewed and are found in Flowsheets. The following problems were reviewed today and where indicated follow up appointments were made and/or referrals ordered.    {HRA Questions DO NOT SELECT UNTIL ROOMING IS COMPLETE:[id number]}        Objective   There were no vitals filed for this visit.   There is no height or weight on file to calculate BMI.        Physical Exam           No Known Allergies  Prior to Visit Medications    Medication Sig Taking? Authorizing Provider   traZODone (DESYREL) 50 MG tablet Take 1 tablet by mouth nightly  Meryl Dean MD   lisinopril (PRINIVIL;ZESTRIL) 20 MG tablet take 1 tablet by mouth once daily  Meryl Dean MD   amLODIPine (NORVASC) 10 MG tablet take 1 tablet by mouth once daily  Meryl Dean MD   dicyclomine (BENTYL) 10 MG capsule TAKE 1 CAPSULE BY MOUTH IN THE MORNING AND 1 AT BEDTIME  Meryl Dean MD   citalopram (CELEXA) 20 MG tablet take 1 tablet by mouth once daily  Meryl Dean MD   rosuvastatin (CRESTOR) 10 MG tablet Take 1 tablet by mouth daily  Meryl Dean MD   mupirocin (BACTROBAN) 2 % ointment Apply topically 3 times daily Apply topically 3 times daily.  Meryl Dean MD   meloxicam (MOBIC) 15 MG tablet Take 1 tablet by mouth once daily  Meryl Dean MD   ibuprofen (ADVIL;MOTRIN) 800 MG tablet Take 1 tablet by mouth every 6 hours as needed for Pain  Lyions, MD Shan   RA COL-RITE 100 MG capsule Take 1

## 2025-06-29 DIAGNOSIS — M79.641 PAIN IN BOTH HANDS: ICD-10-CM

## 2025-06-29 DIAGNOSIS — M79.642 PAIN IN BOTH HANDS: ICD-10-CM

## 2025-06-30 NOTE — TELEPHONE ENCOUNTER
LAST VISIT:   6/27/2025     Future Appointments   Date Time Provider Department Center   7/7/2025  1:30 PM Debbie Johnson MD [facility]   8/15/2025 11:00 AM Meryl Dean MD [facility] DEP

## 2025-07-01 ENCOUNTER — HOSPITAL ENCOUNTER (OUTPATIENT)
Age: 75
Setting detail: SPECIMEN
Discharge: HOME | End: 2025-07-01

## 2025-07-01 DIAGNOSIS — R73.03 PREDIABETES: ICD-10-CM

## 2025-07-01 DIAGNOSIS — E78.00 PURE HYPERCHOLESTEROLEMIA: ICD-10-CM

## 2025-07-01 DIAGNOSIS — E87.0 HYPERNATREMIA: ICD-10-CM

## 2025-07-01 DIAGNOSIS — K21.9 GASTROESOPHAGEAL REFLUX DISEASE WITHOUT ESOPHAGITIS: ICD-10-CM

## 2025-07-01 DIAGNOSIS — R53.83 FATIGUE, UNSPECIFIED TYPE: ICD-10-CM

## 2025-07-01 LAB
ALBUMIN SERPL-MCNC: 4.4 G/DL (ref 3.5–5.2)
ALBUMIN/GLOB SERPL: 1.6 {RATIO} (ref 1–2.5)
ALP SERPL-CCNC: 68 U/L (ref 40–129)
ALT SERPL-CCNC: 40 U/L (ref 10–50)
ANION GAP SERPL CALCULATED.3IONS-SCNC: 11 MMOL/L (ref 9–16)
AST SERPL-CCNC: 29 U/L (ref 10–50)
BILIRUB SERPL-MCNC: 0.4 MG/DL (ref 0–1.2)
BUN SERPL-MCNC: 13 MG/DL (ref 8–23)
CALCIUM SERPL-MCNC: 9.2 MG/DL (ref 8.6–10.4)
CHLORIDE SERPL-SCNC: 101 MMOL/L (ref 98–107)
CHOLEST SERPL-MCNC: 190 MG/DL (ref 0–199)
CHOLESTEROL/HDL RATIO: 3.3
CO2 SERPL-SCNC: 28 MMOL/L (ref 20–31)
CREAT SERPL-MCNC: 0.7 MG/DL (ref 0.7–1.2)
ERYTHROCYTE [DISTWIDTH] IN BLOOD BY AUTOMATED COUNT: 13.1 % (ref 11.8–14.4)
EST. AVERAGE GLUCOSE BLD GHB EST-MCNC: 114 MG/DL
FOLATE SERPL-MCNC: 35.9 NG/ML (ref 4.8–24.2)
GFR, ESTIMATED: >90 ML/MIN/1.73M2
GLUCOSE SERPL-MCNC: 113 MG/DL (ref 74–99)
HBA1C MFR BLD: 5.6 % (ref 4–6)
HCT VFR BLD AUTO: 45.1 % (ref 40.7–50.3)
HDLC SERPL-MCNC: 58 MG/DL
HGB BLD-MCNC: 14.3 G/DL (ref 13–17)
LDLC SERPL CALC-MCNC: 95 MG/DL (ref 0–100)
MCH RBC QN AUTO: 27.9 PG (ref 25.2–33.5)
MCHC RBC AUTO-ENTMCNC: 31.7 G/DL (ref 28.4–34.8)
MCV RBC AUTO: 87.9 FL (ref 82.6–102.9)
NRBC BLD-RTO: 0 PER 100 WBC
PLATELET # BLD AUTO: 280 K/UL (ref 138–453)
PMV BLD AUTO: 11.3 FL (ref 8.1–13.5)
POTASSIUM SERPL-SCNC: 5.2 MMOL/L (ref 3.7–5.3)
PROT SERPL-MCNC: 7.1 G/DL (ref 6.6–8.7)
RBC # BLD AUTO: 5.13 M/UL (ref 4.21–5.77)
SODIUM SERPL-SCNC: 140 MMOL/L (ref 136–145)
TRIGL SERPL-MCNC: 187 MG/DL
TSH SERPL DL<=0.05 MIU/L-ACNC: 2.53 UIU/ML (ref 0.27–4.2)
VIT B12 SERPL-MCNC: 1722 PG/ML (ref 232–1245)
VLDLC SERPL CALC-MCNC: 37 MG/DL (ref 1–30)
WBC OTHER # BLD: 8.3 K/UL (ref 3.5–11.3)

## 2025-07-02 RX ORDER — MELOXICAM 15 MG/1
15 TABLET ORAL DAILY
Qty: 30 TABLET | Refills: 2 | Status: SHIPPED | OUTPATIENT
Start: 2025-07-02

## 2025-07-07 ENCOUNTER — HOSPITAL ENCOUNTER (OUTPATIENT)
Dept: PAIN MANAGEMENT | Facility: CLINIC | Age: 75
Discharge: HOME | End: 2025-07-07
Payer: MEDICARE

## 2025-07-07 ENCOUNTER — RESULTS FOLLOW-UP (OUTPATIENT)
Dept: PRIMARY CARE CLINIC | Age: 75
End: 2025-07-07

## 2025-07-07 VITALS
HEIGHT: 66 IN | DIASTOLIC BLOOD PRESSURE: 77 MMHG | SYSTOLIC BLOOD PRESSURE: 130 MMHG | RESPIRATION RATE: 16 BRPM | OXYGEN SATURATION: 96 % | TEMPERATURE: 98 F | HEART RATE: 81 BPM | WEIGHT: 160 LBS | BODY MASS INDEX: 25.71 KG/M2

## 2025-07-07 DIAGNOSIS — R79.89 ELEVATED VITAMIN B12 LEVEL: Primary | ICD-10-CM

## 2025-07-07 DIAGNOSIS — R52 PAIN MANAGEMENT: ICD-10-CM

## 2025-07-07 PROCEDURE — 64494 INJ PARAVERT F JNT L/S 2 LEV: CPT | Performed by: PAIN MEDICINE

## 2025-07-07 PROCEDURE — 64494 INJ PARAVERT F JNT L/S 2 LEV: CPT

## 2025-07-07 PROCEDURE — 64493 INJ PARAVERT F JNT L/S 1 LEV: CPT | Performed by: PAIN MEDICINE

## 2025-07-07 PROCEDURE — 6360000002 HC RX W HCPCS: Performed by: PAIN MEDICINE

## 2025-07-07 PROCEDURE — 64493 INJ PARAVERT F JNT L/S 1 LEV: CPT

## 2025-07-07 RX ORDER — LIDOCAINE HYDROCHLORIDE 5 MG/ML
INJECTION, SOLUTION INFILTRATION; INTRAVENOUS
Status: COMPLETED | OUTPATIENT
Start: 2025-07-07 | End: 2025-07-07

## 2025-07-07 RX ORDER — MIDAZOLAM HYDROCHLORIDE 2 MG/2ML
INJECTION, SOLUTION INTRAMUSCULAR; INTRAVENOUS
Status: COMPLETED | OUTPATIENT
Start: 2025-07-07 | End: 2025-07-07

## 2025-07-07 RX ORDER — BUPIVACAINE HYDROCHLORIDE 2.5 MG/ML
INJECTION, SOLUTION EPIDURAL; INFILTRATION; INTRACAUDAL; PERINEURAL
Status: COMPLETED | OUTPATIENT
Start: 2025-07-07 | End: 2025-07-07

## 2025-07-07 RX ADMIN — MIDAZOLAM HYDROCHLORIDE 2 MG: 1 INJECTION, SOLUTION INTRAMUSCULAR; INTRAVENOUS at 13:27

## 2025-07-07 RX ADMIN — BUPIVACAINE HYDROCHLORIDE 10 ML: 2.5 INJECTION, SOLUTION EPIDURAL; INFILTRATION; INTRACAUDAL; PERINEURAL at 13:36

## 2025-07-07 RX ADMIN — LIDOCAINE HYDROCHLORIDE 10 ML: 5 INJECTION, SOLUTION INFILTRATION; INTRAVENOUS at 13:31

## 2025-07-07 ASSESSMENT — PAIN SCALES - GENERAL: PAINLEVEL_OUTOF10: 2

## 2025-07-07 ASSESSMENT — PAIN DESCRIPTION - FREQUENCY: FREQUENCY: INTERMITTENT

## 2025-07-07 ASSESSMENT — PAIN DESCRIPTION - DESCRIPTORS: DESCRIPTORS: ACHING

## 2025-07-07 ASSESSMENT — PAIN DESCRIPTION - PAIN TYPE: TYPE: CHRONIC PAIN

## 2025-07-07 ASSESSMENT — PAIN DESCRIPTION - ORIENTATION: ORIENTATION: LOWER

## 2025-07-07 NOTE — ASSESSMENT & PLAN NOTE
- stable. Continue current treatment plan  - discussed that NSAID use in HTN is not ideal, but pt reports difficulty with normal functioning without mobic

## 2025-07-07 NOTE — H&P
Pain Pre-Op H&P Note    Debbie Johnson MD    HPI: Saravanan Schaefer  presents with back pain.  Reports 80% or more temporary benefit with diagnostic medial branch block x 1 and wishes to proceed confirmatory block.    Past Medical History:   Diagnosis Date    Acid indigestion     Cancer (HCC)     skin    Chronic back pain     Hyperlipidemia     Hypertension     NATALY (obstructive sleep apnea)     does not tolerate cpap- uses nasal strip    Osteoarthritis     Retention of urine        Past Surgical History:   Procedure Laterality Date    BACK SURGERY  2014    S5 pinch nerve laser correction    CERVICAL SPINE SURGERY      small plate    COLONOSCOPY      HAND SURGERY Right     HAND TENDON SURGERY Left     HERNIA REPAIR      SKIN BIOPSY      WRIST SURGERY Right 2/8/2024    RIGHT WRIST PI ENTERECTOMY PROXIMAL ROW CARPECTOMY WITH CAPITATE RESURFACING WITH ARTHROSURFACE BORA-CAPITATE performed by Shan Salter MD at [facility] OR       Family History   Problem Relation Age of Onset    COPD Mother     COPD Sister     COPD Brother        No Known Allergies      Current Outpatient Medications:     meloxicam (MOBIC) 15 MG tablet, Take 1 tablet by mouth once daily, Disp: 30 tablet, Rfl: 2    traZODone (DESYREL) 50 MG tablet, Take 1 tablet by mouth nightly, Disp: 30 tablet, Rfl: 0    lisinopril (PRINIVIL;ZESTRIL) 20 MG tablet, take 1 tablet by mouth once daily, Disp: 90 tablet, Rfl: 1    amLODIPine (NORVASC) 10 MG tablet, take 1 tablet by mouth once daily, Disp: 90 tablet, Rfl: 1    dicyclomine (BENTYL) 10 MG capsule, TAKE 1 CAPSULE BY MOUTH IN THE MORNING AND 1 AT BEDTIME, Disp: 60 capsule, Rfl: 1    citalopram (CELEXA) 20 MG tablet, take 1 tablet by mouth once daily, Disp: 90 tablet, Rfl: 1    rosuvastatin (CRESTOR) 10 MG tablet, Take 1 tablet by mouth daily, Disp: 90 tablet, Rfl: 0    ibuprofen (ADVIL;MOTRIN) 800 MG tablet, Take 1 tablet by mouth every 6 hours as needed for Pain, Disp: 90 tablet, Rfl: 1    RA COL-RITE 100

## 2025-07-07 NOTE — OP NOTE
Lumbar Facet Nerve Block Injection:  Surgeon: Debbie Johnson MD     PRE-OP DIAGNOSIS: M47.817 (lumbosacral spondylosis), M54.5 (low back pain)    POST-OP DIAGNOSIS: Same.    PROCEDURE PERFORMED: Lumbar Facet Nerve Block Multiple Levels  Bilateral L4 - 5 and L5 - S1.     Physician confirmed and marked the surgical site.    EBL: minimal      CONSENT: Patient has undergone the educational process with this procedure, is aware and fully understands the risks involved: potential damage to any and all body organs including possible bleeding, infection, nerve injury, paralysis, allergic reaction and headache. Patient also understands that the procedure will be undertaken in a safe, controlled, and monitored setting. Patient recognizes that the benefits may include relief from pain and reduction in the oral use of medications. Patient agreed to proceed.    Risks, benefits, and alternatives including postponing the procedure were discussed. The patient does wish to proceed with the procedure at this time.      PREP: Timeout was performed prior to starting the procedure. The patient's back was prepped with chloroprep and draped appropriately. 0.5% lidocaine was used to anesthetize the skin and subcutaneous tissue.     PROCEDURE NOTE: 25 gauge spinal needles were advanced under  fluoroscopic guidance to the appropriate anatomic location for the medial branches and/or dorsal ramus corresponding to the indicated facet joints. Aspiration was negative. 1 ml of 0.25% marcaine was then injected at each site to block medial branch nerve innervating the  Bilateral L4 - 5 and L5 - S1 facet joints.    Patient tolerated the procedure well, no complications occurred.    At the end of the injection the physician withdrew the needle and the nurse applied a sterile bandage to the site. Patient transferred to the recovery room in satisfactory condition. Appropriate written discharge instructions were given to the patient. If good results

## 2025-07-07 NOTE — DISCHARGE INSTRUCTIONS
You have received a sedative/anesthetic therefore you should not consume any alcoholic beverages for 24 hours.  Do not drive or operate machinery for 24 hours.   Do not take a tub bath for 72 hours after procedure (this includes hot tubs).  You may shower, but avoid hot water to injection site.   Avoid strenuous activity TODAY especially if you experience dizziness.   Remove band-aid the next day.    Wash off any residual iodine 24 hours from today.   Do not use heat, heating pad, or any other heating device over the injection site for 3 days after the procedure.    If you experience pain after your procedure, you may continue with your current pain medication as prescribed.  (DO NOT INCREASE YOUR PAIN MEDICATION WITHOUT TALKING TO DOCTOR)  Soreness and pain at injection site is common, may use ice to reduce soreness.    Please complete pain diary as instructed. The office staff will call you to discuss the results of the procedure within 24 hours, please call the office if you do not hear from them.      Call [facility] at [phone] if you experience:   Fever, chills or temperature over 100    Vomiting, headache, persistent stiff neck, nausea or blurred vision   Difficulty urinating or unable to urinate within 8 hours   Increase in weakness, numbness or loss of function of limbs  Increased redness, swelling or drainage at the injection site

## 2025-07-07 NOTE — ASSESSMENT & PLAN NOTE
- Significant back pain limits walking and activities like fishing and hunting.  - Severe muscle tightness in back after minimal activity on physical exam.  - Receiving test shots from pain management, next set scheduled for 07/07/2025.  - this chronic condition is managed by specialist. No findings today merit changing the current treatment plan.

## 2025-07-08 ENCOUNTER — TELEPHONE (OUTPATIENT)
Dept: PAIN MANAGEMENT | Age: 75
End: 2025-07-08

## 2025-07-08 DIAGNOSIS — G89.29 CHRONIC BILATERAL LOW BACK PAIN, UNSPECIFIED WHETHER SCIATICA PRESENT: ICD-10-CM

## 2025-07-08 DIAGNOSIS — M54.50 CHRONIC BILATERAL LOW BACK PAIN, UNSPECIFIED WHETHER SCIATICA PRESENT: ICD-10-CM

## 2025-07-08 DIAGNOSIS — M47.817 LUMBOSACRAL SPONDYLOSIS WITHOUT MYELOPATHY: Primary | ICD-10-CM

## 2025-07-08 NOTE — TELEPHONE ENCOUNTER
S/P: Lumbar Facet Nerve Block Multiple Levels  Bilateral L4 - 5 and L5 - S1.       DOS: 07/07/25    Pain  before procedure with activity : 7    Pain after procedure with activity: 2    Activities following procedure include: Did some walking, walked around his pond, picked up    % of pain relief: 80%    Procedure successful: Yes    OR scheduled: 07/28/25

## 2025-07-24 DIAGNOSIS — F51.01 PRIMARY INSOMNIA: ICD-10-CM

## 2025-07-24 DIAGNOSIS — E78.00 PURE HYPERCHOLESTEROLEMIA: ICD-10-CM

## 2025-07-24 NOTE — TELEPHONE ENCOUNTER
Last Visit:  6/27/2025     Next Visit Date:  Future Appointments   Date Time Provider Department Center   7/28/2025  3:00 PM Debbie Johnson MD [facility]   8/11/2025 10:30 AM Debbie Johnson MD [facility]   8/15/2025 11:00 AM Meryl Dean MD [facility]   8/21/2025 11:00 AM Angelica Michaud, APRN - CNP [facility]

## 2025-07-25 RX ORDER — ROSUVASTATIN CALCIUM 10 MG/1
10 TABLET, COATED ORAL DAILY
Qty: 90 TABLET | Refills: 1 | Status: SHIPPED | OUTPATIENT
Start: 2025-07-25 | End: 2026-01-21

## 2025-07-25 RX ORDER — TRAZODONE HYDROCHLORIDE 50 MG/1
50 TABLET ORAL NIGHTLY
Qty: 90 TABLET | Refills: 0 | Status: SHIPPED | OUTPATIENT
Start: 2025-07-25 | End: 2025-10-23

## 2025-07-28 ENCOUNTER — HOSPITAL ENCOUNTER (OUTPATIENT)
Dept: PAIN MANAGEMENT | Facility: CLINIC | Age: 75
Discharge: HOME | End: 2025-07-28
Payer: MEDICARE

## 2025-07-28 VITALS
RESPIRATION RATE: 10 BRPM | DIASTOLIC BLOOD PRESSURE: 62 MMHG | TEMPERATURE: 97.8 F | WEIGHT: 160 LBS | HEIGHT: 66 IN | SYSTOLIC BLOOD PRESSURE: 133 MMHG | OXYGEN SATURATION: 96 % | BODY MASS INDEX: 25.71 KG/M2 | HEART RATE: 84 BPM

## 2025-07-28 DIAGNOSIS — R52 PAIN MANAGEMENT: ICD-10-CM

## 2025-07-28 PROBLEM — M47.817 LUMBOSACRAL SPONDYLOSIS WITHOUT MYELOPATHY: Status: ACTIVE | Noted: 2025-07-28

## 2025-07-28 PROCEDURE — 6360000002 HC RX W HCPCS: Performed by: PAIN MEDICINE

## 2025-07-28 PROCEDURE — 64635 DESTROY LUMB/SAC FACET JNT: CPT

## 2025-07-28 PROCEDURE — 64636 DESTROY L/S FACET JNT ADDL: CPT | Performed by: PAIN MEDICINE

## 2025-07-28 PROCEDURE — 64635 DESTROY LUMB/SAC FACET JNT: CPT | Performed by: PAIN MEDICINE

## 2025-07-28 PROCEDURE — 64636 DESTROY L/S FACET JNT ADDL: CPT

## 2025-07-28 RX ORDER — BUPIVACAINE HYDROCHLORIDE 2.5 MG/ML
INJECTION, SOLUTION EPIDURAL; INFILTRATION; INTRACAUDAL; PERINEURAL
Status: COMPLETED | OUTPATIENT
Start: 2025-07-28 | End: 2025-07-28

## 2025-07-28 RX ORDER — MIDAZOLAM HYDROCHLORIDE 2 MG/2ML
INJECTION, SOLUTION INTRAMUSCULAR; INTRAVENOUS
Status: COMPLETED | OUTPATIENT
Start: 2025-07-28 | End: 2025-07-28

## 2025-07-28 RX ORDER — LIDOCAINE HYDROCHLORIDE 5 MG/ML
INJECTION, SOLUTION INFILTRATION; INTRAVENOUS
Status: COMPLETED | OUTPATIENT
Start: 2025-07-28 | End: 2025-07-28

## 2025-07-28 RX ADMIN — LIDOCAINE HYDROCHLORIDE 6 ML: 5 INJECTION, SOLUTION INFILTRATION; INTRAVENOUS at 12:37

## 2025-07-28 RX ADMIN — BUPIVACAINE HYDROCHLORIDE 5 ML: 2.5 INJECTION, SOLUTION EPIDURAL; INFILTRATION; INTRACAUDAL; PERINEURAL at 12:41

## 2025-07-28 RX ADMIN — MIDAZOLAM HYDROCHLORIDE 2 MG: 1 INJECTION, SOLUTION INTRAMUSCULAR; INTRAVENOUS at 12:33

## 2025-07-28 ASSESSMENT — PAIN - FUNCTIONAL ASSESSMENT
PAIN_FUNCTIONAL_ASSESSMENT: PREVENTS OR INTERFERES WITH ALL ACTIVE AND SOME PASSIVE ACTIVITIES
PAIN_FUNCTIONAL_ASSESSMENT: 0-10
PAIN_FUNCTIONAL_ASSESSMENT: 0-10

## 2025-07-28 ASSESSMENT — PAIN DESCRIPTION - DESCRIPTORS: DESCRIPTORS: ACHING

## 2025-07-28 NOTE — OP NOTE
5 and L5 - S1 . Position confirmed with fluoroscopy.  Aspiration was negative. Motor stimulation checked at 2hz up to 3V and confirmed negative for radicular stimulation. After confirmation of the needle placement the patient received 1 ml of 0.25% marcaine to provide anesthesia. Radiofrequency was delivered to the lumbar region at 80 degrees for a limit of 90 seconds in length with no ill effect.     The patient tolerated the procedure well and was transported to the recovery room where the patient was monitored with no complications and with stable vital signs. Patient was discharged with appropriate written discharge instructions. Follow-up discussed.      Debbie Johnson MD

## 2025-07-28 NOTE — DISCHARGE INSTRUCTIONS
You have received a sedative/anesthetic therefore you should not consume any alcoholic beverages for 24 hours.  Do not drive or operate machinery for 24 hours.   Do not take a tub bath for 72 hours after procedure (this includes hot tubs).  You may shower, but avoid hot water to injection site.   Avoid strenuous activity TODAY especially if you experience dizziness.   Remove band-aid the next day.    Wash off any residual iodine 24 hours from today.   Do not use heat, heating pad, or any other heating device over the injection site for 3 days after the procedure.    If you experience pain after your procedure, you may continue with your current pain medication as prescribed.  (DO NOT INCREASE YOUR PAIN MEDICATION WITHOUT TALKING TO DOCTOR)  Soreness and pain at injection site is common, may use ice to reduce soreness.    Call [facility] at [phone] if you experience:   Fever, chills or temperature over 100    Vomiting, headache, persistent stiff neck, nausea or blurred vision   Difficulty urinating or unable to urinate within 8 hours   Increase in weakness, numbness or loss of function of limbs  Increased redness, swelling or drainage at the injection site

## 2025-07-28 NOTE — H&P
Pain Pre-Op H&P Note    Debbie Johnson MD    HPI: Saravanan Schaefer  presents with back pain, reports to me 80% or more temporary benefit with diagnostic medial branch block x 2 wishes to proceed with radiofrequency ablation.    Past Medical History:   Diagnosis Date    Acid indigestion     Cancer (HCC)     skin    Chronic back pain     Hyperlipidemia     Hypertension     NATALY (obstructive sleep apnea)     does not tolerate cpap- uses nasal strip    Osteoarthritis     Retention of urine        Past Surgical History:   Procedure Laterality Date    BACK SURGERY  2014    S5 pinch nerve laser correction    CERVICAL SPINE SURGERY      small plate    COLONOSCOPY      HAND SURGERY Right     HAND TENDON SURGERY Left     HERNIA REPAIR      SKIN BIOPSY      WRIST SURGERY Right 2/8/2024    RIGHT WRIST PI ENTERECTOMY PROXIMAL ROW CARPECTOMY WITH CAPITATE RESURFACING WITH ARTHROSURFACE BORA-CAPITATE performed by Shan Salter MD at [facility] OR       Family History   Problem Relation Age of Onset    COPD Mother     COPD Sister     COPD Brother        No Known Allergies      Current Outpatient Medications:     rosuvastatin (CRESTOR) 10 MG tablet, Take 1 tablet by mouth daily, Disp: 90 tablet, Rfl: 1    traZODone (DESYREL) 50 MG tablet, Take 1 tablet by mouth nightly, Disp: 90 tablet, Rfl: 0    meloxicam (MOBIC) 15 MG tablet, Take 1 tablet by mouth once daily, Disp: 30 tablet, Rfl: 2    lisinopril (PRINIVIL;ZESTRIL) 20 MG tablet, take 1 tablet by mouth once daily, Disp: 90 tablet, Rfl: 1    amLODIPine (NORVASC) 10 MG tablet, take 1 tablet by mouth once daily, Disp: 90 tablet, Rfl: 1    dicyclomine (BENTYL) 10 MG capsule, TAKE 1 CAPSULE BY MOUTH IN THE MORNING AND 1 AT BEDTIME, Disp: 60 capsule, Rfl: 1    citalopram (CELEXA) 20 MG tablet, take 1 tablet by mouth once daily, Disp: 90 tablet, Rfl: 1    ibuprofen (ADVIL;MOTRIN) 800 MG tablet, Take 1 tablet by mouth every 6 hours as needed for Pain, Disp: 90 tablet, Rfl: 1    RA

## 2025-08-03 DIAGNOSIS — K57.90 DIVERTICULOSIS: ICD-10-CM

## 2025-08-04 RX ORDER — DICYCLOMINE HYDROCHLORIDE 10 MG/1
10 CAPSULE ORAL 2 TIMES DAILY
Qty: 60 CAPSULE | Refills: 0 | Status: SHIPPED | OUTPATIENT
Start: 2025-08-04

## 2025-08-11 ENCOUNTER — HOSPITAL ENCOUNTER (OUTPATIENT)
Dept: PAIN MANAGEMENT | Facility: CLINIC | Age: 75
Discharge: HOME | End: 2025-08-11
Payer: MEDICARE

## 2025-08-11 VITALS
RESPIRATION RATE: 12 BRPM | WEIGHT: 160 LBS | SYSTOLIC BLOOD PRESSURE: 140 MMHG | BODY MASS INDEX: 25.71 KG/M2 | TEMPERATURE: 98 F | DIASTOLIC BLOOD PRESSURE: 80 MMHG | OXYGEN SATURATION: 95 % | HEART RATE: 71 BPM | HEIGHT: 66 IN

## 2025-08-11 DIAGNOSIS — R52 PAIN MANAGEMENT: ICD-10-CM

## 2025-08-11 PROCEDURE — 64635 DESTROY LUMB/SAC FACET JNT: CPT | Performed by: PAIN MEDICINE

## 2025-08-11 PROCEDURE — 64635 DESTROY LUMB/SAC FACET JNT: CPT

## 2025-08-11 PROCEDURE — 64636 DESTROY L/S FACET JNT ADDL: CPT

## 2025-08-11 PROCEDURE — 6360000002 HC RX W HCPCS: Performed by: PAIN MEDICINE

## 2025-08-11 PROCEDURE — 64636 DESTROY L/S FACET JNT ADDL: CPT | Performed by: PAIN MEDICINE

## 2025-08-11 RX ORDER — MIDAZOLAM HYDROCHLORIDE 2 MG/2ML
INJECTION, SOLUTION INTRAMUSCULAR; INTRAVENOUS
Status: COMPLETED | OUTPATIENT
Start: 2025-08-11 | End: 2025-08-11

## 2025-08-11 RX ORDER — LIDOCAINE HYDROCHLORIDE 5 MG/ML
INJECTION, SOLUTION INFILTRATION; INTRAVENOUS
Status: COMPLETED | OUTPATIENT
Start: 2025-08-11 | End: 2025-08-11

## 2025-08-11 RX ORDER — BUPIVACAINE HYDROCHLORIDE 2.5 MG/ML
INJECTION, SOLUTION EPIDURAL; INFILTRATION; INTRACAUDAL; PERINEURAL
Status: COMPLETED | OUTPATIENT
Start: 2025-08-11 | End: 2025-08-11

## 2025-08-11 RX ADMIN — BUPIVACAINE HYDROCHLORIDE 5 ML: 2.5 INJECTION, SOLUTION EPIDURAL; INFILTRATION; INTRACAUDAL; PERINEURAL at 10:28

## 2025-08-11 RX ADMIN — MIDAZOLAM HYDROCHLORIDE 2 MG: 1 INJECTION, SOLUTION INTRAMUSCULAR; INTRAVENOUS at 10:20

## 2025-08-11 RX ADMIN — LIDOCAINE HYDROCHLORIDE 6 ML: 5 INJECTION, SOLUTION INFILTRATION; INTRAVENOUS at 10:22

## 2025-08-11 ASSESSMENT — PAIN - FUNCTIONAL ASSESSMENT
PAIN_FUNCTIONAL_ASSESSMENT: PREVENTS OR INTERFERES WITH MANY ACTIVE NOT PASSIVE ACTIVITIES
PAIN_FUNCTIONAL_ASSESSMENT: 0-10
PAIN_FUNCTIONAL_ASSESSMENT: 0-10

## 2025-08-11 ASSESSMENT — PAIN DESCRIPTION - DESCRIPTORS: DESCRIPTORS: ACHING

## 2025-08-15 ENCOUNTER — OFFICE VISIT (OUTPATIENT)
Dept: PRIMARY CARE CLINIC | Age: 75
End: 2025-08-15

## 2025-08-15 VITALS
BODY MASS INDEX: 26.42 KG/M2 | OXYGEN SATURATION: 96 % | SYSTOLIC BLOOD PRESSURE: 124 MMHG | WEIGHT: 164.4 LBS | HEIGHT: 66 IN | DIASTOLIC BLOOD PRESSURE: 82 MMHG | HEART RATE: 78 BPM

## 2025-08-15 DIAGNOSIS — I10 ESSENTIAL HYPERTENSION: ICD-10-CM

## 2025-08-15 DIAGNOSIS — E66.3 OVERWEIGHT (BMI 25.0-29.9): ICD-10-CM

## 2025-08-15 DIAGNOSIS — R79.89 ELEVATED VITAMIN B12 LEVEL: ICD-10-CM

## 2025-08-15 DIAGNOSIS — R53.83 FATIGUE, UNSPECIFIED TYPE: Primary | ICD-10-CM

## 2025-08-15 DIAGNOSIS — F41.1 GAD (GENERALIZED ANXIETY DISORDER): ICD-10-CM

## 2025-08-15 DIAGNOSIS — M47.817 LUMBOSACRAL SPONDYLOSIS WITHOUT MYELOPATHY: ICD-10-CM

## 2025-08-15 DIAGNOSIS — K57.90 DIVERTICULOSIS: ICD-10-CM

## 2025-08-15 DIAGNOSIS — Z87.891 FORMER CIGARETTE SMOKER: ICD-10-CM

## 2025-08-15 RX ORDER — CITALOPRAM HYDROBROMIDE 20 MG/1
TABLET ORAL
Qty: 90 TABLET | Refills: 1 | Status: SHIPPED | OUTPATIENT
Start: 2025-08-15

## 2025-08-21 ENCOUNTER — OFFICE VISIT (OUTPATIENT)
Dept: PAIN MANAGEMENT | Age: 75
End: 2025-08-21
Payer: MEDICARE

## 2025-08-21 VITALS — HEIGHT: 66 IN | BODY MASS INDEX: 26.36 KG/M2 | WEIGHT: 164 LBS

## 2025-08-21 DIAGNOSIS — M51.360 DEGENERATION OF INTERVERTEBRAL DISC OF LUMBAR REGION WITH DISCOGENIC BACK PAIN: ICD-10-CM

## 2025-08-21 DIAGNOSIS — M54.50 CHRONIC BILATERAL LOW BACK PAIN, UNSPECIFIED WHETHER SCIATICA PRESENT: ICD-10-CM

## 2025-08-21 DIAGNOSIS — M47.817 LUMBOSACRAL SPONDYLOSIS WITHOUT MYELOPATHY: Primary | ICD-10-CM

## 2025-08-21 DIAGNOSIS — G89.29 CHRONIC BILATERAL LOW BACK PAIN, UNSPECIFIED WHETHER SCIATICA PRESENT: ICD-10-CM

## 2025-08-21 PROCEDURE — 1160F RVW MEDS BY RX/DR IN RCRD: CPT | Performed by: NURSE PRACTITIONER

## 2025-08-21 PROCEDURE — 99214 OFFICE O/P EST MOD 30 MIN: CPT | Performed by: NURSE PRACTITIONER

## 2025-08-21 PROCEDURE — G8417 CALC BMI ABV UP PARAM F/U: HCPCS | Performed by: NURSE PRACTITIONER

## 2025-08-21 PROCEDURE — 1123F ACP DISCUSS/DSCN MKR DOCD: CPT | Performed by: NURSE PRACTITIONER

## 2025-08-21 PROCEDURE — 1159F MED LIST DOCD IN RCRD: CPT | Performed by: NURSE PRACTITIONER

## 2025-08-21 PROCEDURE — G8427 DOCREV CUR MEDS BY ELIG CLIN: HCPCS | Performed by: NURSE PRACTITIONER

## 2025-08-21 PROCEDURE — 1036F TOBACCO NON-USER: CPT | Performed by: NURSE PRACTITIONER

## 2025-08-21 PROCEDURE — 3017F COLORECTAL CA SCREEN DOC REV: CPT | Performed by: NURSE PRACTITIONER

## 2025-08-21 ASSESSMENT — ENCOUNTER SYMPTOMS
COUGH: 0
SHORTNESS OF BREATH: 0
BACK PAIN: 1
CONSTIPATION: 0

## (undated) DEVICE — SPLINT ORTH W4XL15IN LAYERED FBRGLS FOAM PD BRTH BK MOLD

## (undated) DEVICE — PADDING,UNDERCAST,COTTON, 4"X4YD STERILE: Brand: MEDLINE

## (undated) DEVICE — TUBING SUCT 12FR MAL ALUM SHFT FN CAP VENT UNIV CONN W/ OBT

## (undated) DEVICE — SUTURE PROL SZ 3-0 L18IN NONABSORBABLE BLU L19MM PS-2 3/8 8687H

## (undated) DEVICE — DISPOSABLE TOURNIQUET CUFF SINGLE BLADDER, SINGLE PORT AND QUICK CONNECT CONNECTOR: Brand: COLOR CUFF

## (undated) DEVICE — SUTURE VCRL + SZ 3-0 L27IN ABSRB UD L26MM SH 1/2 CIR VCP416H

## (undated) DEVICE — BLUNTFILL: Brand: MONOJECT

## (undated) DEVICE — IMMOBILIZER ORTH CONTACT CLOSURE STRP LG 18X9 IN PROCARE

## (undated) DEVICE — SUTURE VCRL + SZ 4-0 L18IN ABSRB UD L19MM PS-2 3/8 CIR PRIM VCP496H

## (undated) DEVICE — GLOVE SURG SZ 8 L12IN THK75MIL DK GRN LTX FREE

## (undated) DEVICE — STRAP,POSITIONING,KNEE/BODY,FOAM,4X60": Brand: MEDLINE

## (undated) DEVICE — PADDING,UNDERCAST,COTTON, 3X4YD STERILE: Brand: MEDLINE

## (undated) DEVICE — SPONGE GZ 4X4 IN 16-PLY DETECTABLE W/ DMT MSTR TAG

## (undated) DEVICE — SOLUTION IRRIG 1000ML 0.9% SOD CHL USP POUR PLAS BTL

## (undated) DEVICE — STANDARD HYPODERMIC NEEDLE,POLYPROPYLENE HUB: Brand: MONOJECT

## (undated) DEVICE — LIQUIBAND RAPID ADHESIVE 36/CS 0.8ML: Brand: MEDLINE

## (undated) DEVICE — MHPB HAND AND FOOT PACK: Brand: MEDLINE INDUSTRIES, INC.

## (undated) DEVICE — SUTURE VCRL + SZ 2-0 L27IN ABSRB WHT SH 1/2 CIR TAPERCUT VCP417H

## (undated) DEVICE — SUTURE MCRYL SZ 3 0 L18IN ABSRB UD PS 2 3 8 CIR REV CUT NDL MCP497G

## (undated) DEVICE — CLOTH PRE OP W9XL10.5IN 2% CHG FRAGRANCE RNS FREE READYPREP

## (undated) DEVICE — APPLICATOR MEDICATED 26 CC SOLUTION HI LT ORNG CHLORAPREP

## (undated) DEVICE — SYRINGE MED 30ML STD CLR PLAS LUERLOCK TIP N CTRL DISP

## (undated) DEVICE — GLOVE ORTHO 7 1/2   MSG9475

## (undated) DEVICE — SUTURE VCRL SZ 3-0 L27IN ABSRB UD L19MM PS-2 3/8 CIR PRIM J427H

## (undated) DEVICE — DRAPE,U/ SHT,SPLIT,PLAS,STERIL: Brand: MEDLINE

## (undated) DEVICE — BNDG,ELSTC,MATRIX,STRL,4"X5YD,LF,HOOK&LP: Brand: MEDLINE

## (undated) DEVICE — TUBING, SUCTION, 9/32" X 20', STRAIGHT: Brand: MEDLINE INDUSTRIES, INC.

## (undated) DEVICE — SPLINT THMB W3XL12IN FBRGLS PD PRECUT LTWT DURABLE FAST SET